# Patient Record
Sex: FEMALE | Race: WHITE | NOT HISPANIC OR LATINO | Employment: FULL TIME | URBAN - METROPOLITAN AREA
[De-identification: names, ages, dates, MRNs, and addresses within clinical notes are randomized per-mention and may not be internally consistent; named-entity substitution may affect disease eponyms.]

---

## 2017-01-30 ENCOUNTER — TELEPHONE (OUTPATIENT)
Dept: FAMILY MEDICINE CLINIC | Facility: CLINIC | Age: 42
End: 2017-01-30

## 2017-01-30 NOTE — TELEPHONE ENCOUNTER
Pt called stating she got discharged from psychiatrist due to missing too many appts. She wants to know if you will began prescribing for her. She is taking Ambien, Trazodone, Prozac, Wellbutrin.

## 2017-02-22 ENCOUNTER — OFFICE VISIT (OUTPATIENT)
Dept: FAMILY MEDICINE CLINIC | Facility: CLINIC | Age: 42
End: 2017-02-22

## 2017-02-22 VITALS
BODY MASS INDEX: 44.41 KG/M2 | DIASTOLIC BLOOD PRESSURE: 82 MMHG | HEIGHT: 68 IN | WEIGHT: 293 LBS | HEART RATE: 90 BPM | TEMPERATURE: 98.3 F | SYSTOLIC BLOOD PRESSURE: 124 MMHG | OXYGEN SATURATION: 98 %

## 2017-02-22 DIAGNOSIS — K21.00 ESOPHAGITIS, REFLUX: ICD-10-CM

## 2017-02-22 DIAGNOSIS — G47.00 INSOMNIA, UNSPECIFIED TYPE: ICD-10-CM

## 2017-02-22 DIAGNOSIS — IMO0001 BLUES: Primary | ICD-10-CM

## 2017-02-22 PROCEDURE — 99214 OFFICE O/P EST MOD 30 MIN: CPT | Performed by: NURSE PRACTITIONER

## 2017-02-22 RX ORDER — ZOLPIDEM TARTRATE 10 MG/1
10 TABLET ORAL NIGHTLY
Qty: 30 TABLET | Refills: 0 | Status: SHIPPED | OUTPATIENT
Start: 2017-02-22 | End: 2017-04-07 | Stop reason: SDUPTHER

## 2017-02-22 RX ORDER — FLUOXETINE HYDROCHLORIDE 20 MG/1
20 CAPSULE ORAL NIGHTLY
Qty: 90 CAPSULE | Refills: 3 | Status: SHIPPED | OUTPATIENT
Start: 2017-02-22

## 2017-02-22 NOTE — PROGRESS NOTES
"Subjective   Roxie Dickson is a 42 y.o. female.     History of Present Illness   Patient presenting to the office today with concerns over her acid reflux.  She consistently takes Prilosec 20 mg every single night but she's noticed that she's having to take more in the morning when she wakes up.  She is having heartburn throughout the day belching and burping and abdominal bloating she's been on Prilosec for a long time but for the past several weeks is not working as well as it used to.    Also patient was getting her Prozac in her Ambien from her psychiatrist.  She switching her psychiatrist but does not see them until next month and she is ran out of both of those medications she is requesting to have this refilled.  Patient is doing well with the Ambien using it for difficulty falling asleep and is working well and has resolved her issues without any side effects.  Also the Prozac is working well to help with her mood she's taking it as directed without side effects  The following portions of the patient's history were reviewed and updated as appropriate: allergies, current medications, past social history and problem list.    Review of Systems   All other systems reviewed and are negative.      Objective   Visit Vitals   • /82 (BP Location: Right arm, Patient Position: Sitting)   • Pulse 90   • Temp 98.3 °F (36.8 °C)   • Ht 68\" (172.7 cm)   • Wt (!) 310 lb 6.4 oz (141 kg)   • SpO2 98%   • BMI 47.2 kg/m2     Physical Exam   Constitutional: She is oriented to person, place, and time. Vital signs are normal. She appears well-developed and well-nourished. No distress.   HENT:   Head: Normocephalic.   Cardiovascular: Normal rate, regular rhythm and normal heart sounds.    Pulmonary/Chest: Effort normal and breath sounds normal.   Neurological: She is alert and oriented to person, place, and time. Gait normal.   Psychiatric: She has a normal mood and affect. Her behavior is normal. Judgment and thought " content normal.   Vitals reviewed.      Assessment/Plan   Problem List Items Addressed This Visit        Digestive    Esophagitis, reflux    Relevant Orders    Ambulatory Referral to Gastroenterology       Other    Blues - Primary    Relevant Medications    FLUoxetine (PROzac) 20 MG capsule    Cannot sleep    Relevant Medications    zolpidem (AMBIEN) 10 MG tablet           increase Prilosec to 40mg at night

## 2017-03-06 ENCOUNTER — OFFICE VISIT (OUTPATIENT)
Dept: GASTROENTEROLOGY | Facility: CLINIC | Age: 42
End: 2017-03-06

## 2017-03-06 VITALS
WEIGHT: 293 LBS | BODY MASS INDEX: 44.41 KG/M2 | SYSTOLIC BLOOD PRESSURE: 138 MMHG | DIASTOLIC BLOOD PRESSURE: 92 MMHG | HEIGHT: 68 IN

## 2017-03-06 DIAGNOSIS — E66.01 MORBID OBESITY DUE TO EXCESS CALORIES (HCC): ICD-10-CM

## 2017-03-06 DIAGNOSIS — K21.9 GASTROESOPHAGEAL REFLUX DISEASE, ESOPHAGITIS PRESENCE NOT SPECIFIED: Primary | ICD-10-CM

## 2017-03-06 DIAGNOSIS — R19.8 ALTERNATING CONSTIPATION AND DIARRHEA: ICD-10-CM

## 2017-03-06 PROCEDURE — 99204 OFFICE O/P NEW MOD 45 MIN: CPT | Performed by: INTERNAL MEDICINE

## 2017-03-06 RX ORDER — DICYCLOMINE HCL 20 MG
20 TABLET ORAL EVERY 6 HOURS PRN
Qty: 90 TABLET | Refills: 3 | Status: SHIPPED | OUTPATIENT
Start: 2017-03-06 | End: 2017-06-24 | Stop reason: SDUPTHER

## 2017-03-06 RX ORDER — PANTOPRAZOLE SODIUM 40 MG/1
40 TABLET, DELAYED RELEASE ORAL DAILY
Qty: 30 TABLET | Refills: 2 | Status: SHIPPED | OUTPATIENT
Start: 2017-03-06 | End: 2017-05-28 | Stop reason: SDUPTHER

## 2017-03-06 NOTE — PROGRESS NOTES
Chief Complaint   Patient presents with   • Heartburn       Subjective     HPI    Roxie Dickson is a 42 y.o. female with a past medical history noted below who presents for evaluation of heartburn symptoms.  Symptoms present at least 5 years.  However she reports worsening over the past year.  She describes burning esophageal pain, coming up into her throat.  Worse at night and in the morning.  Associated water brash.  Some nausea,no vomiting.  No dysphagia.  She does not associate with any particular foods.  She has been taking Prilosec for the symptoms however she takes it at night.  She finds this incompletely relieves her symptoms and occasionally will take a second dose.  Complicating the issue, she is morbidly obese and has had a weight over 300 pounds for at least the past year.  She additionally endorses alternating constipation and diarrhea.  She describes this as not having a bowel movement for 3 days.  This will be followed by profuse watery diarrhea during the daytime on the remaining days.  There are no associated nocturnal stools.  She has had the symptoms also for years.    No familly history of GI malignancy.  No smoking or ETOH.  Works at a desk job.      Past Medical History   Diagnosis Date   • Acid reflux disease    • Allergic    • Anxiety    • Depression    • Diarrhea    • GERD (gastroesophageal reflux disease)    • History of anemia    • History of bronchitis    • History of mononucleosis    • Insomnia    • Low back pain    • Migraines    • PONV (postoperative nausea and vomiting)      dtr & mom experience nausea/vomiting         Current Outpatient Prescriptions:   •  albuterol (PROVENTIL HFA;VENTOLIN HFA) 108 (90 BASE) MCG/ACT inhaler, Inhale 2 puffs Every 4 (Four) Hours As Needed for wheezing., Disp: , Rfl:   •  buPROPion XL (WELLBUTRIN XL) 300 MG 24 hr tablet, Take 300 mg by mouth Every Night., Disp: , Rfl: 1  •  dicyclomine (BENTYL) 20 MG tablet, Take 1 tablet by mouth Every 6 (Six)  Hours As Needed (cramping, diarrhea) for up to 30 days., Disp: 90 tablet, Rfl: 3  •  DiphenhydrAMINE HCl (BENADRYL ALLERGY PO), Take 75 mg by mouth At Night As Needed., Disp: , Rfl:   •  FLUoxetine (PROzac) 20 MG capsule, Take 1 capsule by mouth Every Night., Disp: 90 capsule, Rfl: 3  •  HYDROcodone-acetaminophen (NORCO) 7.5-325 MG per tablet, Take 1 tablet by mouth Every 6 (Six) Hours As Needed for moderate pain (4-6)., Disp: 20 tablet, Rfl: 0  •  ibuprofen (ADVIL) 200 MG tablet, Take 800 mg by mouth Every 6 (Six) Hours As Needed for mild pain (1-3)., Disp: , Rfl:   •  pantoprazole (PROTONIX) 40 MG EC tablet, Take 1 tablet by mouth Daily., Disp: 30 tablet, Rfl: 2  •  traZODone (DESYREL) 150 MG tablet, Take 150 mg by mouth Every Night., Disp: , Rfl: 1  •  zolpidem (AMBIEN) 10 MG tablet, Take 1 tablet by mouth Every Night., Disp: 30 tablet, Rfl: 0    No Known Allergies    Social History     Social History   • Marital status: Single     Spouse name: N/A   • Number of children: N/A   • Years of education: N/A     Occupational History   • Not on file.     Social History Main Topics   • Smoking status: Never Smoker   • Smokeless tobacco: Never Used   • Alcohol use No   • Drug use: Yes     Special: Hydrocodone      Comment: prescribed by MD   • Sexual activity: Defer     Other Topics Concern   • Not on file     Social History Narrative       Family History   Problem Relation Age of Onset   • Hypertension Mother    • Cancer Maternal Grandmother      ovarian cancer   • Cancer Brother      leukemia       Review of Systems   Constitutional: Negative for activity change, appetite change and fatigue.   HENT: Negative for congestion, sore throat and trouble swallowing.    Eyes: Negative.    Respiratory: Positive for cough.    Cardiovascular: Negative.    Gastrointestinal: Positive for constipation and diarrhea. Negative for abdominal distention, abdominal pain and blood in stool.        + heartburn, alternating constipation and  diarrhea   Endocrine: Negative for cold intolerance and heat intolerance.   Genitourinary: Negative for difficulty urinating, dysuria and frequency.   Musculoskeletal: Negative for arthralgias, back pain and myalgias.   Skin: Negative.    Hematological: Negative for adenopathy. Does not bruise/bleed easily.   All other systems reviewed and are negative.      Objective     Vitals:    03/06/17 0806   BP: 138/92     Last 2 weights    03/06/17  0806   Weight: (!) 309 lb 12.8 oz (141 kg)     Body mass index is 47.1 kg/(m^2).    Physical Exam   Constitutional: She is oriented to person, place, and time. She appears well-developed and well-nourished. No distress.   Morbidly obese   HENT:   Head: Normocephalic and atraumatic.   Right Ear: External ear normal.   Left Ear: External ear normal.   Nose: Nose normal.   Mouth/Throat: Oropharynx is clear and moist.   Eyes: Conjunctivae and EOM are normal. Right eye exhibits no discharge. Left eye exhibits no discharge. No scleral icterus.   Neck: Normal range of motion. Neck supple. No thyromegaly present.   No supraclavicular adenopathy   Cardiovascular: Normal rate, regular rhythm, normal heart sounds and intact distal pulses.  Exam reveals no gallop.    No murmur heard.  No lower extremity edema   Pulmonary/Chest: Effort normal and breath sounds normal. No respiratory distress. She has no wheezes.   Abdominal: Soft. Normal appearance and bowel sounds are normal. She exhibits no distension and no mass. There is no hepatosplenomegaly. There is no tenderness. There is no rigidity, no rebound and no guarding. No hernia.   Genitourinary:   Genitourinary Comments: Rectal exam deferred   Musculoskeletal: Normal range of motion. She exhibits no edema or tenderness.   No atrophy of upper or lower extremities.  Normal digits and nails of both hands.   Lymphadenopathy:     She has no cervical adenopathy.   Neurological: She is alert and oriented to person, place, and time. She displays no  atrophy. Coordination normal.   Skin: Skin is warm and dry. No rash noted. She is not diaphoretic. No erythema.   Psychiatric: She has a normal mood and affect. Her behavior is normal. Judgment and thought content normal.   Vitals reviewed.      WBC   Date Value Ref Range Status   12/14/2016 6.22 4.50 - 10.70 10*3/mm3 Final   12/06/2016 8.33 4.50 - 10.70 10*3/mm3 Final     RBC   Date Value Ref Range Status   12/14/2016 4.44 3.90 - 5.20 10*6/mm3 Final   12/06/2016 4.68 3.90 - 5.20 10*6/mm3 Final     HEMOGLOBIN   Date Value Ref Range Status   12/14/2016 12.6 11.9 - 15.5 g/dL Final   12/06/2016 13.3 11.9 - 15.5 g/dL Final     HEMATOCRIT   Date Value Ref Range Status   12/14/2016 40.2 35.6 - 45.5 % Final   12/06/2016 41.6 35.6 - 45.5 % Final     MCV   Date Value Ref Range Status   12/14/2016 90.5 80.5 - 98.2 fL Final   12/06/2016 88.9 80.5 - 98.2 fL Final     MCH   Date Value Ref Range Status   12/14/2016 28.4 26.9 - 32.0 pg Final   12/06/2016 28.4 26.9 - 32.0 pg Final     MCHC   Date Value Ref Range Status   12/14/2016 31.3 (L) 32.4 - 36.3 g/dL Final   12/06/2016 32.0 (L) 32.4 - 36.3 g/dL Final     RDW   Date Value Ref Range Status   12/14/2016 13.6 (H) 11.7 - 13.0 % Final   12/06/2016 14.0 (H) 11.7 - 13.0 % Final     RDW-SD   Date Value Ref Range Status   12/14/2016 44.9 37.0 - 54.0 fl Final     MPV   Date Value Ref Range Status   12/14/2016 10.8 6.0 - 12.0 fL Final     PLATELETS   Date Value Ref Range Status   12/14/2016 270 140 - 500 10*3/mm3 Final   12/06/2016 310 140 - 500 10*3/mm3 Final     NEUTROPHIL %   Date Value Ref Range Status   12/14/2016 57.6 42.7 - 76.0 % Final     NEUTROPHIL REL %   Date Value Ref Range Status   12/06/2016 67.2 42.7 - 76.0 % Final     LYMPHOCYTE %   Date Value Ref Range Status   12/14/2016 35.2 19.6 - 45.3 % Final     LYMPHOCYTE REL %   Date Value Ref Range Status   12/06/2016 24.7 19.6 - 45.3 % Final     MONOCYTE %   Date Value Ref Range Status   12/14/2016 5.3 5.0 - 12.0 % Final      MONOCYTE REL %   Date Value Ref Range Status   12/06/2016 6.4 5.0 - 12.0 % Final     EOSINOPHIL %   Date Value Ref Range Status   12/14/2016 1.6 0.3 - 6.2 % Final     EOSINOPHIL REL %   Date Value Ref Range Status   12/06/2016 1.1 0.3 - 6.2 % Final     BASOPHIL %   Date Value Ref Range Status   12/14/2016 0.3 0.0 - 1.5 % Final     BASOPHIL REL %   Date Value Ref Range Status   12/06/2016 0.2 0.0 - 1.5 % Final     IMMATURE GRANS %   Date Value Ref Range Status   12/14/2016 0.0 0.0 - 0.5 % Final     NEUTROPHILS, ABSOLUTE   Date Value Ref Range Status   12/14/2016 3.58 1.90 - 8.10 10*3/mm3 Final     NEUTROPHILS ABSOLUTE   Date Value Ref Range Status   12/06/2016 5.60 1.90 - 8.10 10*3/mm3 Final     LYMPHOCYTES, ABSOLUTE   Date Value Ref Range Status   12/14/2016 2.19 0.90 - 4.80 10*3/mm3 Final     LYMPHOCYTES ABSOLUTE   Date Value Ref Range Status   12/06/2016 2.06 0.90 - 4.80 10*3/mm3 Final     MONOCYTES, ABSOLUTE   Date Value Ref Range Status   12/14/2016 0.33 0.20 - 1.20 10*3/mm3 Final     MONOCYTES ABSOLUTE   Date Value Ref Range Status   12/06/2016 0.53 0.20 - 1.20 10*3/mm3 Final     EOSINOPHILS, ABSOLUTE   Date Value Ref Range Status   12/14/2016 0.10 0.00 - 0.70 10*3/mm3 Final     EOSINOPHILS ABSOLUTE   Date Value Ref Range Status   12/06/2016 0.09 0.00 - 0.70 10*3/mm3 Final     BASOPHILS, ABSOLUTE   Date Value Ref Range Status   12/14/2016 0.02 0.00 - 0.20 10*3/mm3 Final     BASOPHILS ABSOLUTE   Date Value Ref Range Status   12/06/2016 0.02 0.00 - 0.20 10*3/mm3 Final     IMMATURE GRANS, ABSOLUTE   Date Value Ref Range Status   12/14/2016 0.00 0.00 - 0.03 10*3/mm3 Final       GLUCOSE   Date Value Ref Range Status   12/14/2016 102 (H) 65 - 99 mg/dL Final     SODIUM   Date Value Ref Range Status   12/14/2016 136 136 - 145 mmol/L Final   12/06/2016 137 136 - 145 mmol/L Final     POTASSIUM   Date Value Ref Range Status   12/14/2016 3.7 3.5 - 5.2 mmol/L Final   12/06/2016 4.3 3.5 - 5.2 mmol/L Final     CO2   Date  Value Ref Range Status   12/14/2016 25.7 22.0 - 29.0 mmol/L Final     TOTAL CO2   Date Value Ref Range Status   12/06/2016 23.3 22.0 - 29.0 mmol/L Final     CHLORIDE   Date Value Ref Range Status   12/14/2016 99 98 - 107 mmol/L Final   12/06/2016 99 98 - 107 mmol/L Final     ANION GAP   Date Value Ref Range Status   12/14/2016 11.3 mmol/L Final     CREATININE   Date Value Ref Range Status   12/14/2016 0.93 0.57 - 1.00 mg/dL Final   12/06/2016 0.86 0.57 - 1.00 mg/dL Final     BUN   Date Value Ref Range Status   12/14/2016 10 6 - 20 mg/dL Final   12/06/2016 10 6 - 20 mg/dL Final     BUN/CREATININE RATIO   Date Value Ref Range Status   12/14/2016 10.8 7.0 - 25.0 Final   12/06/2016 11.6 7.0 - 25.0 Final     CALCIUM   Date Value Ref Range Status   12/14/2016 9.4 8.6 - 10.5 mg/dL Final   12/06/2016 10.0 8.6 - 10.5 mg/dL Final     EGFR NON  AMER   Date Value Ref Range Status   12/14/2016 66 >60 mL/min/1.73 Final     EGFR NON  AM   Date Value Ref Range Status   12/06/2016 72 >60 mL/min/1.73 Final     ALKALINE PHOSPHATASE   Date Value Ref Range Status   12/14/2016 56 39 - 117 U/L Final   12/06/2016 58 39 - 117 U/L Final     TOTAL PROTEIN   Date Value Ref Range Status   12/14/2016 7.1 6.0 - 8.5 g/dL Final     ALT (SGPT)   Date Value Ref Range Status   12/14/2016 17 1 - 33 U/L Final   12/06/2016 13 1 - 33 U/L Final     AST (SGOT)   Date Value Ref Range Status   12/14/2016 16 1 - 32 U/L Final   12/06/2016 12 1 - 32 U/L Final     TOTAL BILIRUBIN   Date Value Ref Range Status   12/14/2016 0.3 0.1 - 1.2 mg/dL Final   12/06/2016 0.3 0.1 - 1.2 mg/dL Final     ALBUMIN   Date Value Ref Range Status   12/14/2016 4.00 3.50 - 5.20 g/dL Final   12/06/2016 4.30 3.50 - 5.20 g/dL Final     GLOBULIN   Date Value Ref Range Status   12/14/2016 3.1 gm/dL Final     A/G RATIO   Date Value Ref Range Status   12/14/2016 1.3 g/dL Final   12/06/2016 1.5 g/dL Final         12/7/16 GB USN  FINDINGS: The small part of the pancreas which is  visualized appears  normal. The liver demonstrates some slight hyperechogenicity in  comparison to the echogenicity of the right kidney. This suggests  hepatic steatosis. No focal hepatic lesion is identified. The  gallbladder is in situ and contains numerous calculi. The largest  measures about 1 cm diameter. There is no gallbladder wall thickening  nor any haroon-cholecystic fluid. The common bile duct measures 5 mm.  There is no sonographic Webster's sign. The right kidney appears normal  and measures about 11.8 cm long.      IMPRESSION:  Cholelithiasis without evidence of cholecystitis or biliary  obstruction      No notes on file    Assessment/Plan    Gastroesophageal reflux disease, esophagitis presence not specified: Long-standing issue with progressive worsening.  Incompletely treated with omeprazole.  No alarm symptoms (dysphagia, weight loss)    Alternating constipation and diarrhea: Given duration and stability of symptoms, I am suspicious for irritable bowel syndrome    Morbid obesity due to excess calories: She reports being over 300 pounds for at least the past year.  Her BMI is 47    Plan:  -She is to stop the omeprazole.  She is to start pantoprazole.  I have advised her to take this medication every morning 30 minutes before breakfast.  -She is to start dicyclomine every 6 hours as needed for diarrhea.  -She is to use over-the-counter MiraLAX as needed on days that she has constipation  -We discussed that if she does not improve in terms of her GERD symptoms in the next few months, then EGD evaluation may be necessary  -Also advised a goal of getting her weight below 300 pounds over the next 4-5 months.  This will help not only her GERD symptoms but also her general health    Roxie was seen today for heartburn.    Diagnoses and all orders for this visit:    Gastroesophageal reflux disease, esophagitis presence not specified  -     pantoprazole (PROTONIX) 40 MG EC tablet; Take 1 tablet by mouth  Daily.    Alternating constipation and diarrhea  -     dicyclomine (BENTYL) 20 MG tablet; Take 1 tablet by mouth Every 6 (Six) Hours As Needed (cramping, diarrhea) for up to 30 days.    Morbid obesity due to excess calories        I have discussed the above plan with the patient.  They verbalize understanding and are in agreement with the plan.  They have been advised to contact the office for any questions, concerns, or changes related to their health.    EMR Dragon/Transcription disclaimer:       Much of this encounter note is an electronic transcription/translation of spoken language to printed text. The electronic translation of spoken language may permit erroneous, or at times, nonsensical words or phrases to be inadvertently transcribed; Although I have reviewed the note for such errors, some may still exist.

## 2017-03-06 NOTE — PATIENT INSTRUCTIONS
Start the pantoprazole, take every morning  30 mins before breakfast    Dicyclomine on days with diarrhea, miralax or milk of magnesia for constipation    Goal of 10# weight loss over the next 4-5 months    For any additional questions, concerns or changes to your condition after today's office visit please contact the office at [phone].

## 2017-03-08 ENCOUNTER — OFFICE VISIT (OUTPATIENT)
Dept: SURGERY | Facility: CLINIC | Age: 42
End: 2017-03-08

## 2017-03-08 VITALS — HEIGHT: 68 IN | HEART RATE: 118 BPM | BODY MASS INDEX: 44.41 KG/M2 | OXYGEN SATURATION: 98 % | WEIGHT: 293 LBS

## 2017-03-08 DIAGNOSIS — K90.9 DIARRHEA DUE TO MALABSORPTION: Primary | ICD-10-CM

## 2017-03-08 DIAGNOSIS — R19.7 DIARRHEA DUE TO MALABSORPTION: Primary | ICD-10-CM

## 2017-03-08 PROCEDURE — 99024 POSTOP FOLLOW-UP VISIT: CPT | Performed by: SURGERY

## 2017-03-08 RX ORDER — TRAZODONE HYDROCHLORIDE 100 MG/1
1 TABLET ORAL DAILY
COMMUNITY
Start: 2017-03-07 | End: 2018-04-05 | Stop reason: DRUGHIGH

## 2017-03-08 NOTE — PATIENT INSTRUCTIONS
PLEASE START THIS AFTER YOU HAVE YOUR COLONOSCOPY,  IF YOU HAVE ONE SCHEDULED    You may have an increase in bloating and gas for the first 2 weeks after you start this additional fiber, if you stick with this it should gradually go away.    Please pay attention to your water intake and stay well hydrated, very few people drink enough water.     Metamucil one Tablespoon in 8 oz of water then ileana with another 8 oz of water in the morning or Fibercon or Citracel   You want insoluable fiber  You may use Milk of Mag or Miralax for constipation  And these may be taken with supplemental fiber     High-Fiber Diet  Fiber, also called dietary fiber, is a type of carbohydrate found in fruits, vegetables, whole grains, and beans. A high-fiber diet can have many health benefits. Your health care provider may recommend a high-fiber diet to help:  · Prevent constipation. Fiber can make your bowel movements more regular.  · Lower your cholesterol.  · Relieve hemorrhoids, uncomplicated diverticulosis, or irritable bowel syndrome.  · Prevent overeating as part of a weight-loss plan.  · Prevent heart disease, type 2 diabetes, and certain cancers.  WHAT IS MY PLAN?  The recommended daily intake of fiber includes:  · 38 grams for men under age 50.  · 30 grams for men over age 50.  · 25 grams for women under age 50.  · 21 grams for women over age 50.  You can get the recommended daily intake of dietary fiber by eating a variety of fruits, vegetables, grains, and beans. Your health care provider may also recommend a fiber supplement if it is not possible to get enough fiber through your diet.  WHAT DO I NEED TO KNOW ABOUT A HIGH-FIBER DIET?  · Fiber supplements have not been widely studied for their effectiveness, so it is better to get fiber through food sources.  · Always check the fiber content on the nutrition facts label of any prepackaged food. Look for foods that contain at least 5 grams of fiber per serving.  · Ask your  dietitian if you have questions about specific foods that are related to your condition, especially if those foods are not listed in the following section.  · Increase your daily fiber consumption gradually. Increasing your intake of dietary fiber too quickly may cause bloating, cramping, or gas.  · Drink plenty of water. Water helps you to digest fiber.  WHAT FOODS CAN I EAT?  Grains  Whole-grain breads. Multigrain cereal. Oats and oatmeal. Brown rice. Barley. Bulgur wheat. Millet. Bran muffins. Popcorn. Rye wafer crackers.  Vegetables  Sweet potatoes. Spinach. Kale. Artichokes. Cabbage. Broccoli. Green peas. Carrots. Squash.  Fruits  Berries. Pears. Apples. Oranges. Avocados. Prunes and raisins. Dried figs.  Meats and Other Protein Sources  Navy, kidney, elias, and soy beans. Split peas. Lentils. Nuts and seeds.  Dairy  Fiber-fortified yogurt.  Beverages  Fiber-fortified soy milk. Fiber-fortified orange juice.  Other  Fiber bars.  The items listed above may not be a complete list of recommended foods or beverages. Contact your dietitian for more options.  WHAT FOODS ARE NOT RECOMMENDED?  Grains  White bread. Pasta made with refined flour. White rice.  Vegetables  Fried potatoes. Canned vegetables. Well-cooked vegetables.   Fruits  Fruit juice. Cooked, strained fruit.  Meats and Other Protein Sources  Fatty cuts of meat. Fried poultry or fried fish.  Dairy  Milk. Yogurt. Cream cheese. Sour cream.  Beverages  Soft drinks.  Other  Cakes and pastries. Butter and oils.  The items listed above may not be a complete list of foods and beverages to avoid. Contact your dietitian for more information.  WHAT ARE SOME TIPS FOR INCLUDING HIGH-FIBER FOODS IN MY DIET?  · Eat a wide variety of high-fiber foods.  · Make sure that half of all grains consumed each day are whole grains.  · Replace breads and cereals made from refined flour or white flour with whole-grain breads and cereals.  · Replace white rice with brown rice,  bulgur wheat, or millet.  · Start the day with a breakfast that is high in fiber, such as a cereal that contains at least 5 grams of fiber per serving.  · Use beans in place of meat in soups, salads, or pasta.  · Eat high-fiber snacks, such as berries, raw vegetables, nuts, or popcorn.      Make sure you discuss any questions you have with your health care provider.

## 2017-03-08 NOTE — PROGRESS NOTES
Follow-up laparoscopic cholecystectomy for symptomatically cholelithiasis    Subjective:  Patient return to the office today almost 3 months after her cholecystectomy with complaints of diarrhea.  She had recently seen gastroenterology for this and they feel that she has-year-old bowel disease.  I think that's very reasonable assessment and is likely the majority of the cause of her problem.  The patient does think that her diarrhea may be a little bit worse than it was before her surgery, but was certainly present at that time.  She says sometimes she'll go 2 or 3 days without having a bowel movement and then have a day or 2 of severe diarrhea going up to 6 times a day.  This is actually caused her to miss work 5-6 times over the last couple of months.  She was recently started on Protonix and Bentyl, but has actually not started taking them just yet.    Objective:  Gen.: No acute distress, alert and oriented  Abdomen: Soft and benign, incisions nicely healed, no hernia,and pain    Assessment and plan:  Status post cholecystectomy, recovering well  Diarrhea which may be related to irritable bowel disease and/or a postcholecystectomy type of diarrhea.  I'm going to allow her to try the Bentyl and see how she improves with that.  I also encouraged her to improve her fiber intake as she says that her diet essentially consists of Pop tarts, chicken and soda and popcorn.  As stated by Dr. Green, weight loss is critical for this patient and I stressed this with her as well.  If she does not respond to Bentyl, it may be worth considering adding Colestipol, as some post cholecystectomy diarrheal patients will respond.      Frederick Ortega MD  General and Endoscopic Surgery  [facility], [place]    [address], [address]  [place], [place], [address]  P: [phone]  F: [phone]

## 2017-04-03 ENCOUNTER — TELEPHONE (OUTPATIENT)
Dept: FAMILY MEDICINE CLINIC | Facility: CLINIC | Age: 42
End: 2017-04-03

## 2017-04-07 DIAGNOSIS — G47.00 INSOMNIA, UNSPECIFIED TYPE: ICD-10-CM

## 2017-04-07 RX ORDER — ZOLPIDEM TARTRATE 10 MG/1
TABLET ORAL
Qty: 30 TABLET | Refills: 0 | Status: CANCELLED | OUTPATIENT
Start: 2017-04-07

## 2017-04-07 RX ORDER — ZOLPIDEM TARTRATE 10 MG/1
10 TABLET ORAL NIGHTLY
Qty: 30 TABLET | Refills: 0 | Status: SHIPPED | OUTPATIENT
Start: 2017-04-07 | End: 2017-04-07 | Stop reason: SDUPTHER

## 2017-04-07 RX ORDER — ZOLPIDEM TARTRATE 10 MG/1
10 TABLET ORAL NIGHTLY
Qty: 30 TABLET | Refills: 0 | OUTPATIENT
Start: 2017-04-07 | End: 2017-05-15 | Stop reason: SDUPTHER

## 2017-05-11 DIAGNOSIS — G47.00 INSOMNIA, UNSPECIFIED TYPE: ICD-10-CM

## 2017-05-12 RX ORDER — ZOLPIDEM TARTRATE 10 MG/1
TABLET ORAL
Qty: 30 TABLET | Refills: 0 | OUTPATIENT
Start: 2017-05-12

## 2017-05-15 ENCOUNTER — OFFICE VISIT (OUTPATIENT)
Dept: FAMILY MEDICINE CLINIC | Facility: CLINIC | Age: 42
End: 2017-05-15

## 2017-05-15 VITALS
HEIGHT: 68 IN | DIASTOLIC BLOOD PRESSURE: 78 MMHG | WEIGHT: 293 LBS | OXYGEN SATURATION: 97 % | HEART RATE: 74 BPM | BODY MASS INDEX: 44.41 KG/M2 | TEMPERATURE: 97.9 F | SYSTOLIC BLOOD PRESSURE: 122 MMHG

## 2017-05-15 DIAGNOSIS — G47.00 INSOMNIA, UNSPECIFIED TYPE: ICD-10-CM

## 2017-05-15 DIAGNOSIS — Z00.00 ROUTINE GENERAL MEDICAL EXAMINATION AT A HEALTH CARE FACILITY: Primary | ICD-10-CM

## 2017-05-15 LAB
ALBUMIN SERPL-MCNC: 4 G/DL (ref 3.5–5.2)
ALBUMIN/GLOB SERPL: 1.7 G/DL
ALP SERPL-CCNC: 58 U/L (ref 39–117)
ALT SERPL-CCNC: 21 U/L (ref 1–33)
AST SERPL-CCNC: 16 U/L (ref 1–32)
BASOPHILS # BLD AUTO: 0.02 10*3/MM3 (ref 0–0.2)
BASOPHILS NFR BLD AUTO: 0.4 % (ref 0–1.5)
BILIRUB SERPL-MCNC: 0.2 MG/DL (ref 0.1–1.2)
BUN SERPL-MCNC: 8 MG/DL (ref 6–20)
BUN/CREAT SERPL: 10.4 (ref 7–25)
CALCIUM SERPL-MCNC: 9.3 MG/DL (ref 8.6–10.5)
CHLORIDE SERPL-SCNC: 99 MMOL/L (ref 98–107)
CHOLEST SERPL-MCNC: 249 MG/DL (ref 0–200)
CO2 SERPL-SCNC: 25.9 MMOL/L (ref 22–29)
CREAT SERPL-MCNC: 0.77 MG/DL (ref 0.57–1)
EOSINOPHIL # BLD AUTO: 0.13 10*3/MM3 (ref 0–0.7)
EOSINOPHIL NFR BLD AUTO: 2.5 % (ref 0.3–6.2)
ERYTHROCYTE [DISTWIDTH] IN BLOOD BY AUTOMATED COUNT: 13.9 % (ref 11.7–13)
GLOBULIN SER CALC-MCNC: 2.4 GM/DL
GLUCOSE SERPL-MCNC: 117 MG/DL (ref 65–99)
HCT VFR BLD AUTO: 40.2 % (ref 35.6–45.5)
HDLC SERPL-MCNC: 45 MG/DL (ref 40–60)
HGB BLD-MCNC: 13 G/DL (ref 11.9–15.5)
IMM GRANULOCYTES # BLD: 0.02 10*3/MM3 (ref 0–0.03)
IMM GRANULOCYTES NFR BLD: 0.4 % (ref 0–0.5)
LDLC SERPL CALC-MCNC: 175 MG/DL (ref 0–100)
LDLC/HDLC SERPL: 3.9 {RATIO}
LYMPHOCYTES # BLD AUTO: 1.76 10*3/MM3 (ref 0.9–4.8)
LYMPHOCYTES NFR BLD AUTO: 34.1 % (ref 19.6–45.3)
MCH RBC QN AUTO: 28.6 PG (ref 26.9–32)
MCHC RBC AUTO-ENTMCNC: 32.3 G/DL (ref 32.4–36.3)
MCV RBC AUTO: 88.4 FL (ref 80.5–98.2)
MONOCYTES # BLD AUTO: 0.32 10*3/MM3 (ref 0.2–1.2)
MONOCYTES NFR BLD AUTO: 6.2 % (ref 5–12)
NEUTROPHILS # BLD AUTO: 2.91 10*3/MM3 (ref 1.9–8.1)
NEUTROPHILS NFR BLD AUTO: 56.4 % (ref 42.7–76)
PLATELET # BLD AUTO: 288 10*3/MM3 (ref 140–500)
POTASSIUM SERPL-SCNC: 4.4 MMOL/L (ref 3.5–5.2)
PROT SERPL-MCNC: 6.4 G/DL (ref 6–8.5)
RBC # BLD AUTO: 4.55 10*6/MM3 (ref 3.9–5.2)
SODIUM SERPL-SCNC: 137 MMOL/L (ref 136–145)
TRIGL SERPL-MCNC: 143 MG/DL (ref 0–150)
VLDLC SERPL CALC-MCNC: 28.6 MG/DL (ref 5–40)
WBC # BLD AUTO: 5.16 10*3/MM3 (ref 4.5–10.7)

## 2017-05-15 PROCEDURE — 99213 OFFICE O/P EST LOW 20 MIN: CPT | Performed by: NURSE PRACTITIONER

## 2017-05-15 RX ORDER — ZOLPIDEM TARTRATE 10 MG/1
10 TABLET ORAL NIGHTLY
Qty: 30 TABLET | Refills: 0 | Status: SHIPPED | OUTPATIENT
Start: 2017-05-15 | End: 2017-06-15 | Stop reason: SDUPTHER

## 2017-05-19 RX ORDER — ROSUVASTATIN CALCIUM 20 MG/1
20 TABLET, COATED ORAL DAILY
Qty: 30 TABLET | Refills: 3 | Status: SHIPPED | OUTPATIENT
Start: 2017-05-19 | End: 2018-04-06

## 2017-05-28 DIAGNOSIS — K21.9 GASTROESOPHAGEAL REFLUX DISEASE, ESOPHAGITIS PRESENCE NOT SPECIFIED: ICD-10-CM

## 2017-05-30 RX ORDER — PANTOPRAZOLE SODIUM 40 MG/1
TABLET, DELAYED RELEASE ORAL
Qty: 30 TABLET | Refills: 2 | Status: SHIPPED | OUTPATIENT
Start: 2017-05-30 | End: 2018-03-06 | Stop reason: ALTCHOICE

## 2017-06-14 ENCOUNTER — TELEPHONE (OUTPATIENT)
Dept: GASTROENTEROLOGY | Facility: CLINIC | Age: 42
End: 2017-06-14

## 2017-06-14 RX ORDER — DICYCLOMINE HCL 20 MG
20 TABLET ORAL EVERY 6 HOURS
Qty: 90 TABLET | Refills: 3 | Status: SHIPPED | OUTPATIENT
Start: 2017-06-14 | End: 2017-06-26

## 2017-06-14 NOTE — TELEPHONE ENCOUNTER
Faxed request received from [facility] for Dicyclomine 20 mg - take 1 tab po every 6  Hrs as needed for cramping, diarrhea.  Per o/v note of 3/6/17 - was prescribed to use for up to 30 days.    Message to Dr Green to check if ok to refill.

## 2017-06-15 DIAGNOSIS — G47.00 INSOMNIA, UNSPECIFIED TYPE: ICD-10-CM

## 2017-06-15 RX ORDER — ZOLPIDEM TARTRATE 10 MG/1
TABLET ORAL
Qty: 30 TABLET | Refills: 0 | OUTPATIENT
Start: 2017-06-15 | End: 2017-07-14 | Stop reason: SDUPTHER

## 2017-06-19 ENCOUNTER — OFFICE VISIT (OUTPATIENT)
Dept: GASTROENTEROLOGY | Facility: CLINIC | Age: 42
End: 2017-06-19

## 2017-06-19 VITALS
HEIGHT: 68 IN | TEMPERATURE: 98 F | DIASTOLIC BLOOD PRESSURE: 82 MMHG | BODY MASS INDEX: 44.41 KG/M2 | SYSTOLIC BLOOD PRESSURE: 128 MMHG | WEIGHT: 293 LBS

## 2017-06-19 DIAGNOSIS — R11.2 NON-INTRACTABLE VOMITING WITH NAUSEA, UNSPECIFIED VOMITING TYPE: ICD-10-CM

## 2017-06-19 DIAGNOSIS — K21.9 GASTROESOPHAGEAL REFLUX DISEASE, ESOPHAGITIS PRESENCE NOT SPECIFIED: Primary | ICD-10-CM

## 2017-06-19 DIAGNOSIS — E66.01 MORBID OBESITY DUE TO EXCESS CALORIES (HCC): ICD-10-CM

## 2017-06-19 DIAGNOSIS — R19.8 ALTERNATING CONSTIPATION AND DIARRHEA: ICD-10-CM

## 2017-06-19 PROCEDURE — 99214 OFFICE O/P EST MOD 30 MIN: CPT | Performed by: INTERNAL MEDICINE

## 2017-06-19 RX ORDER — SODIUM CHLORIDE, SODIUM LACTATE, POTASSIUM CHLORIDE, CALCIUM CHLORIDE 600; 310; 30; 20 MG/100ML; MG/100ML; MG/100ML; MG/100ML
30 INJECTION, SOLUTION INTRAVENOUS CONTINUOUS
Status: CANCELLED | OUTPATIENT
Start: 2017-06-19

## 2017-06-19 RX ORDER — SODIUM CHLORIDE 0.9 % (FLUSH) 0.9 %
1-10 SYRINGE (ML) INJECTION AS NEEDED
Status: CANCELLED | OUTPATIENT
Start: 2017-06-19

## 2017-06-19 RX ORDER — ONDANSETRON 4 MG/1
4 TABLET, ORALLY DISINTEGRATING ORAL EVERY 8 HOURS PRN
Qty: 12 TABLET | Refills: 0 | Status: SHIPPED | OUTPATIENT
Start: 2017-06-19 | End: 2019-03-04 | Stop reason: HOSPADM

## 2017-06-19 NOTE — PROGRESS NOTES
"Chief Complaint   Patient presents with   • Heartburn     Subjective     HPI  Roxie Dickson is a 42 y.o. female who presents for follow up of GERD, alternating diarrhea and constipation, and morbid obesity.  Initially seen March 6th.  She had been taking omeprazole for her GERD with incomplete relief.  I started her on pantoprazole.  Trial of dicyclomine for diarrhea symptoms and goal of 15# weight loss over 3-4 months.    In terms of her GERD, she has been taking the pantoprazole every morning.  This was working but then she had some breakthrough symptoms and added in a nightime dose of omeprazole which has been helping.  She does take 800mg ibuprofen about 1x per week.  Usually for migraines.  No other NSAIDs.  Still with some breakthrough symptoms.  She has not had an EGD.    In terms of her alternating diarrhea and constipation, dicyclomine has helped.  She takes one in the morning and one in the pm.  She has not had too many issues with constipation.    In terms of her obesity, weight is unchanged.  She has not adjusted her eating habits or activity levels.  BMI remains 48.    New issue of \"violent illness\" every couple of months.  Always occurring in the morning.  She wakes up vomiting, followed by diarrhea.  Lasts an hour or two. She then gets the chills.  She lays down for a few hours and then feels better.  Has not identified any precipitating foods, meds, events.  Similar to food borne illness she had years ago.    Past Medical History:   Diagnosis Date   • Acid reflux disease    • Allergic    • Anxiety    • Depression    • Diarrhea    • GERD (gastroesophageal reflux disease)    • History of anemia    • History of bronchitis    • History of mononucleosis    • Insomnia    • Low back pain    • Migraines    • PONV (postoperative nausea and vomiting)     dtr & mom experience nausea/vomiting       Social History     Social History   • Marital status: Single     Spouse name: N/A   • Number of children: N/A "   • Years of education: N/A     Social History Main Topics   • Smoking status: Never Smoker   • Smokeless tobacco: Never Used   • Alcohol use No   • Drug use: Yes     Special: Hydrocodone      Comment: prescribed by MD   • Sexual activity: Defer     Other Topics Concern   • None     Social History Narrative         Current Outpatient Prescriptions:   •  albuterol (PROVENTIL HFA;VENTOLIN HFA) 108 (90 BASE) MCG/ACT inhaler, Inhale 2 puffs Every 4 (Four) Hours As Needed for wheezing., Disp: , Rfl:   •  buPROPion XL (WELLBUTRIN XL) 300 MG 24 hr tablet, Take 300 mg by mouth Every Night., Disp: , Rfl: 1  •  dicyclomine (BENTYL) 20 MG tablet, Take 1 tablet by mouth Every 6 (Six) Hours., Disp: 90 tablet, Rfl: 3  •  DiphenhydrAMINE HCl (BENADRYL ALLERGY PO), Take 75 mg by mouth At Night As Needed., Disp: , Rfl:   •  FLUoxetine (PROzac) 20 MG capsule, Take 1 capsule by mouth Every Night., Disp: 90 capsule, Rfl: 3  •  ibuprofen (ADVIL) 200 MG tablet, Take 800 mg by mouth Every 6 (Six) Hours As Needed for mild pain (1-3)., Disp: , Rfl:   •  Omeprazole Magnesium (PRILOSEC OTC PO), Take  by mouth., Disp: , Rfl:   •  pantoprazole (PROTONIX) 40 MG EC tablet, TAKE 1 TABLET BY MOUTH DAILY., Disp: 30 tablet, Rfl: 2  •  rosuvastatin (CRESTOR) 20 MG tablet, Take 1 tablet by mouth Daily., Disp: 30 tablet, Rfl: 3  •  traZODone (DESYREL) 100 MG tablet, Take 1 tablet by mouth Daily., Disp: , Rfl:   •  zolpidem (AMBIEN) 10 MG tablet, TAKE 1 TABLET BY MOUTH EVERY NIGHT, Disp: 30 tablet, Rfl: 0  •  ondansetron ODT (ZOFRAN ODT) 4 MG disintegrating tablet, Take 1 tablet by mouth Every 8 (Eight) Hours As Needed for Nausea or Vomiting., Disp: 12 tablet, Rfl: 0    Review of Systems   Constitutional: Negative for activity change, appetite change and fatigue.   HENT: Negative for congestion, sore throat and trouble swallowing.    Respiratory: Negative.    Cardiovascular: Negative.    Gastrointestinal: Positive for diarrhea, nausea and vomiting.  Negative for abdominal distention, abdominal pain and blood in stool.        +heartburn   Endocrine: Negative for cold intolerance and heat intolerance.   Genitourinary: Negative for difficulty urinating, dysuria and frequency.   Musculoskeletal: Negative for arthralgias, back pain and myalgias.   Skin: Negative.    Hematological: Negative for adenopathy. Does not bruise/bleed easily.   All other systems reviewed and are negative.      Objective   Vitals:    06/19/17 0822   BP: 128/82   Temp: 98 °F (36.7 °C)     Last Weight    06/19/17 0822   Weight: (!) 316 lb 6.4 oz (144 kg)     Body mass index is 48.11 kg/(m^2).      Physical Exam   Constitutional: She is oriented to person, place, and time. She appears well-developed and well-nourished. No distress.   obese   HENT:   Head: Normocephalic and atraumatic.   Right Ear: External ear normal.   Left Ear: External ear normal.   Nose: Nose normal.   Eyes: Conjunctivae and EOM are normal. No scleral icterus.   Cardiovascular: Normal rate, regular rhythm, normal heart sounds and intact distal pulses.  Exam reveals no gallop.    No murmur heard.  No lower extremity edema   Pulmonary/Chest: Effort normal and breath sounds normal. No respiratory distress. She has no wheezes.   Abdominal: Soft. Normal appearance and bowel sounds are normal. She exhibits no distension and no mass. There is no hepatosplenomegaly. There is no rigidity, no rebound and no guarding. No hernia.   Genitourinary:   Genitourinary Comments: Rectal exam deferred   Musculoskeletal: Normal range of motion. She exhibits no edema or tenderness.   Normal digits and nails of both hands.  No atrophy or wasting of upper or lower extremeties   Neurological: She is alert and oriented to person, place, and time. She displays no atrophy. Coordination normal.   Skin: Skin is warm and dry. No rash noted. She is not diaphoretic. No erythema.   Psychiatric: She has a normal mood and affect. Her behavior is normal.  Judgment and thought content normal.   Vitals reviewed.      WBC   Date Value Ref Range Status   05/15/2017 5.16 4.50 - 10.70 10*3/mm3 Final   12/14/2016 6.22 4.50 - 10.70 10*3/mm3 Final     RBC   Date Value Ref Range Status   05/15/2017 4.55 3.90 - 5.20 10*6/mm3 Final   12/14/2016 4.44 3.90 - 5.20 10*6/mm3 Final     Hemoglobin   Date Value Ref Range Status   05/15/2017 13.0 11.9 - 15.5 g/dL Final   12/14/2016 12.6 11.9 - 15.5 g/dL Final     Hematocrit   Date Value Ref Range Status   05/15/2017 40.2 35.6 - 45.5 % Final   12/14/2016 40.2 35.6 - 45.5 % Final     MCV   Date Value Ref Range Status   05/15/2017 88.4 80.5 - 98.2 fL Final   12/14/2016 90.5 80.5 - 98.2 fL Final     MCH   Date Value Ref Range Status   05/15/2017 28.6 26.9 - 32.0 pg Final   12/14/2016 28.4 26.9 - 32.0 pg Final     MCHC   Date Value Ref Range Status   05/15/2017 32.3 (L) 32.4 - 36.3 g/dL Final   12/14/2016 31.3 (L) 32.4 - 36.3 g/dL Final     RDW   Date Value Ref Range Status   05/15/2017 13.9 (H) 11.7 - 13.0 % Final   12/14/2016 13.6 (H) 11.7 - 13.0 % Final     RDW-SD   Date Value Ref Range Status   12/14/2016 44.9 37.0 - 54.0 fl Final     MPV   Date Value Ref Range Status   12/14/2016 10.8 6.0 - 12.0 fL Final     Platelets   Date Value Ref Range Status   05/15/2017 288 140 - 500 10*3/mm3 Final   12/14/2016 270 140 - 500 10*3/mm3 Final     Neutrophil %   Date Value Ref Range Status   12/14/2016 57.6 42.7 - 76.0 % Final     Neutrophil Rel %   Date Value Ref Range Status   05/15/2017 56.4 42.7 - 76.0 % Final     Lymphocyte %   Date Value Ref Range Status   12/14/2016 35.2 19.6 - 45.3 % Final     Lymphocyte Rel %   Date Value Ref Range Status   05/15/2017 34.1 19.6 - 45.3 % Final     Monocyte %   Date Value Ref Range Status   12/14/2016 5.3 5.0 - 12.0 % Final     Monocyte Rel %   Date Value Ref Range Status   05/15/2017 6.2 5.0 - 12.0 % Final     Eosinophil %   Date Value Ref Range Status   12/14/2016 1.6 0.3 - 6.2 % Final     Eosinophil Rel %    Date Value Ref Range Status   05/15/2017 2.5 0.3 - 6.2 % Final     Basophil %   Date Value Ref Range Status   12/14/2016 0.3 0.0 - 1.5 % Final     Basophil Rel %   Date Value Ref Range Status   05/15/2017 0.4 0.0 - 1.5 % Final     Immature Grans %   Date Value Ref Range Status   12/14/2016 0.0 0.0 - 0.5 % Final     Neutrophils, Absolute   Date Value Ref Range Status   12/14/2016 3.58 1.90 - 8.10 10*3/mm3 Final     Neutrophils Absolute   Date Value Ref Range Status   05/15/2017 2.91 1.90 - 8.10 10*3/mm3 Final     Lymphocytes, Absolute   Date Value Ref Range Status   12/14/2016 2.19 0.90 - 4.80 10*3/mm3 Final     Lymphocytes Absolute   Date Value Ref Range Status   05/15/2017 1.76 0.90 - 4.80 10*3/mm3 Final     Monocytes, Absolute   Date Value Ref Range Status   12/14/2016 0.33 0.20 - 1.20 10*3/mm3 Final     Monocytes Absolute   Date Value Ref Range Status   05/15/2017 0.32 0.20 - 1.20 10*3/mm3 Final     Eosinophils, Absolute   Date Value Ref Range Status   12/14/2016 0.10 0.00 - 0.70 10*3/mm3 Final     Eosinophils Absolute   Date Value Ref Range Status   05/15/2017 0.13 0.00 - 0.70 10*3/mm3 Final     Basophils, Absolute   Date Value Ref Range Status   12/14/2016 0.02 0.00 - 0.20 10*3/mm3 Final     Basophils Absolute   Date Value Ref Range Status   05/15/2017 0.02 0.00 - 0.20 10*3/mm3 Final     Immature Grans, Absolute   Date Value Ref Range Status   12/14/2016 0.00 0.00 - 0.03 10*3/mm3 Final       Lab Results   Component Value Date    GLUCOSE 102 (H) 12/14/2016    BUN 8 05/15/2017    CREATININE 0.77 05/15/2017    EGFRIFNONA 81 05/15/2017    EGFRIFAFRI 99 05/15/2017    BCR 10.4 05/15/2017    CO2 25.9 05/15/2017    CALCIUM 9.3 05/15/2017    PROTENTOTREF 6.4 05/15/2017    ALBUMIN 4.00 05/15/2017    LABIL2 1.7 05/15/2017    AST 16 05/15/2017    ALT 21 05/15/2017         Imaging Results (last 7 days)     ** No results found for the last 168 hours. **            Assessment/Plan    1. GERD: Persistent despite twice daily  PPI.    2.  Alternating constipation and diarrhea: symptoms well controlled with dicyclomine  3. Acute vomiting, diarrhea episodes: New complaint for her today.  These intermittent and brief episodes make me concerned that she is getting some chronic preformed toxin/food poisoning in her body leading to prompt expulsion.  4.  Morbid obesity: revisited her need to lose weight    Plan:  -EGD for further evaluation given refractory to BID ppi  -zofran odt for acute vomiting episodes  -continue dicyclomine  -goal to 15# weight loss-- to cut 200cal per day    Roxie was seen today for heartburn.    Diagnoses and all orders for this visit:    Gastroesophageal reflux disease, esophagitis presence not specified  -     Case Request; Standing  -     Implement Anesthesia Orders Day of Procedure; Standing  -     Obtain Informed Consent; Standing  -     Insert Peripheral IV; Standing  -     Saline Lock & Maintain IV Access; Standing  -     sodium chloride 0.9 % flush 1-10 mL; Infuse 1-10 mL into a venous catheter As Needed for Line Care.  -     lactated ringers infusion; Infuse 30 mL/hr into a venous catheter Continuous.  -     Case Request    Alternating constipation and diarrhea    Morbid obesity due to excess calories    Non-intractable vomiting with nausea, unspecified vomiting type  -     ondansetron ODT (ZOFRAN ODT) 4 MG disintegrating tablet; Take 1 tablet by mouth Every 8 (Eight) Hours As Needed for Nausea or Vomiting.      Dictated utilizing Dragon dictation

## 2017-06-19 NOTE — PATIENT INSTRUCTIONS
Schedule the EGD    Continue the dicyclomine    Zofran as needed for nausea    Goal of 15# weight loss over the next 3-4 months-- cut 200 calories out per day    For any additional questions, concerns or changes to your condition after today's office visit please contact the office at [phone].

## 2017-06-24 DIAGNOSIS — R19.8 ALTERNATING CONSTIPATION AND DIARRHEA: ICD-10-CM

## 2017-06-26 RX ORDER — DICYCLOMINE HCL 20 MG
TABLET ORAL
Qty: 90 TABLET | Refills: 3 | Status: SHIPPED | OUTPATIENT
Start: 2017-06-26 | End: 2019-03-04 | Stop reason: HOSPADM

## 2017-06-28 ENCOUNTER — ANESTHESIA (OUTPATIENT)
Dept: GASTROENTEROLOGY | Facility: HOSPITAL | Age: 42
End: 2017-06-28

## 2017-06-28 ENCOUNTER — HOSPITAL ENCOUNTER (OUTPATIENT)
Facility: HOSPITAL | Age: 42
Setting detail: HOSPITAL OUTPATIENT SURGERY
Discharge: HOME | End: 2017-06-28
Attending: INTERNAL MEDICINE | Admitting: INTERNAL MEDICINE

## 2017-06-28 ENCOUNTER — ANESTHESIA EVENT (OUTPATIENT)
Dept: GASTROENTEROLOGY | Facility: HOSPITAL | Age: 42
End: 2017-06-28

## 2017-06-28 VITALS
HEART RATE: 80 BPM | RESPIRATION RATE: 16 BRPM | SYSTOLIC BLOOD PRESSURE: 125 MMHG | TEMPERATURE: 98.5 F | DIASTOLIC BLOOD PRESSURE: 76 MMHG | WEIGHT: 293 LBS | BODY MASS INDEX: 47.83 KG/M2 | OXYGEN SATURATION: 99 %

## 2017-06-28 DIAGNOSIS — K21.9 GASTROESOPHAGEAL REFLUX DISEASE, ESOPHAGITIS PRESENCE NOT SPECIFIED: ICD-10-CM

## 2017-06-28 PROCEDURE — 43239 EGD BIOPSY SINGLE/MULTIPLE: CPT | Performed by: INTERNAL MEDICINE

## 2017-06-28 PROCEDURE — 25010000002 PROPOFOL 10 MG/ML EMULSION: Performed by: ANESTHESIOLOGY

## 2017-06-28 PROCEDURE — 88305 TISSUE EXAM BY PATHOLOGIST: CPT | Performed by: INTERNAL MEDICINE

## 2017-06-28 PROCEDURE — 88312 SPECIAL STAINS GROUP 1: CPT | Performed by: INTERNAL MEDICINE

## 2017-06-28 RX ORDER — LIDOCAINE HYDROCHLORIDE 20 MG/ML
INJECTION, SOLUTION INFILTRATION; PERINEURAL AS NEEDED
Status: DISCONTINUED | OUTPATIENT
Start: 2017-06-28 | End: 2017-06-28 | Stop reason: SURG

## 2017-06-28 RX ORDER — PROPOFOL 10 MG/ML
VIAL (ML) INTRAVENOUS CONTINUOUS PRN
Status: DISCONTINUED | OUTPATIENT
Start: 2017-06-28 | End: 2017-06-28 | Stop reason: SURG

## 2017-06-28 RX ORDER — SODIUM CHLORIDE, SODIUM LACTATE, POTASSIUM CHLORIDE, CALCIUM CHLORIDE 600; 310; 30; 20 MG/100ML; MG/100ML; MG/100ML; MG/100ML
30 INJECTION, SOLUTION INTRAVENOUS CONTINUOUS
Status: DISCONTINUED | OUTPATIENT
Start: 2017-06-28 | End: 2017-06-28 | Stop reason: HOSPADM

## 2017-06-28 RX ORDER — PROPOFOL 10 MG/ML
VIAL (ML) INTRAVENOUS AS NEEDED
Status: DISCONTINUED | OUTPATIENT
Start: 2017-06-28 | End: 2017-06-28 | Stop reason: SURG

## 2017-06-28 RX ADMIN — SODIUM CHLORIDE, POTASSIUM CHLORIDE, SODIUM LACTATE AND CALCIUM CHLORIDE 30 ML/HR: 600; 310; 30; 20 INJECTION, SOLUTION INTRAVENOUS at 10:51

## 2017-06-28 RX ADMIN — LIDOCAINE HYDROCHLORIDE 50 MG: 20 INJECTION, SOLUTION INFILTRATION; PERINEURAL at 11:43

## 2017-06-28 RX ADMIN — PROPOFOL 140 MCG/KG/MIN: 10 INJECTION, EMULSION INTRAVENOUS at 11:43

## 2017-06-28 RX ADMIN — PROPOFOL 150 MG: 10 INJECTION, EMULSION INTRAVENOUS at 11:43

## 2017-06-28 NOTE — ANESTHESIA POSTPROCEDURE EVALUATION
Patient: Roxie Dickson    Procedure Summary     Date Anesthesia Start Anesthesia Stop Room / Location    06/28/17 1140 1200  [facility]       Procedure Diagnosis Surgeon Provider    ESOPHAGOGASTRODUODENOSCOPY WITH COLD BIOPSIES (N/A Esophagus) Gastroesophageal reflux disease, esophagitis presence not specified  (Gastroesophageal reflux disease, esophagitis presence not specified [K21.9]) MD Hitesh Martinez MD          Anesthesia Type: MAC  Last vitals  BP      Temp      Pulse     Resp      SpO2        Post Anesthesia Care and Evaluation    Patient location during evaluation: PACU  Patient participation: complete - patient participated  Level of consciousness: awake and alert  Pain score: 0  Pain management: adequate  Airway patency: patent  Anesthetic complications: No anesthetic complications    Cardiovascular status: acceptable  Respiratory status: acceptable  Hydration status: acceptable

## 2017-06-28 NOTE — ANESTHESIA PREPROCEDURE EVALUATION
Anesthesia Evaluation     Patient summary reviewed          Airway   Mallampati: IV  TM distance: <3 FB  Neck ROM: limited  possible difficult intubation  Dental - normal exam     Pulmonary     breath sounds clear to auscultation  Cardiovascular   Exercise tolerance: good (4-7 METS)    Rhythm: regular  Rate: normal        Neuro/Psych  GI/Hepatic/Renal/Endo      Musculoskeletal     Abdominal    Substance History      OB/GYN          Other                                        Anesthesia Plan    ASA 2     MAC     intravenous induction   Anesthetic plan and risks discussed with patient.

## 2017-06-29 LAB
CYTO UR: NORMAL
LAB AP CASE REPORT: NORMAL
Lab: NORMAL
PATH REPORT.FINAL DX SPEC: NORMAL
PATH REPORT.GROSS SPEC: NORMAL

## 2017-07-10 ENCOUNTER — TELEPHONE (OUTPATIENT)
Dept: GASTROENTEROLOGY | Facility: CLINIC | Age: 42
End: 2017-07-10

## 2017-07-10 NOTE — TELEPHONE ENCOUNTER
----- Message from Melisa Green MD sent at 6/29/2017  4:30 PM EDT -----  EGD biopsies: normal duodenum, mild gastritis of the stomach, esophageal inflammation with evidence of García's esophagus.    Continue daily PPI, repeat EGD in 3 years -- pls place in recall

## 2017-07-10 NOTE — TELEPHONE ENCOUNTER
Call to pt.  Advise per Dr Green that EGD bx show normal duodenum, mild gastritis of the stomach, esophageal inflammation with evidence of García's esophagus.    Continue daily PPI, repeat EGD in 3 yrs.  Pt verb understanding.    EGD for 6/28/20 placed in recall.

## 2017-07-14 DIAGNOSIS — G47.00 INSOMNIA, UNSPECIFIED TYPE: ICD-10-CM

## 2017-07-17 ENCOUNTER — TELEPHONE (OUTPATIENT)
Dept: GASTROENTEROLOGY | Facility: CLINIC | Age: 42
End: 2017-07-17

## 2017-07-17 RX ORDER — ZOLPIDEM TARTRATE 10 MG/1
TABLET ORAL
Qty: 30 TABLET | Refills: 0 | OUTPATIENT
Start: 2017-07-17

## 2017-07-17 NOTE — TELEPHONE ENCOUNTER
Called pt and advised per egd bx , normal duodenum , mild gastritis of the stoamch, esophageal inflammation with evidence of  García's esophagus.      She recommends to continue daily ppi, repeat egd in 3 yrs .  Pt verb understanding.     Egd placed in recall for 06/28/2020.

## 2017-07-24 PROBLEM — E78.5 HYPERLIPEMIA: Status: ACTIVE | Noted: 2017-07-24

## 2017-08-17 RX ORDER — SIMVASTATIN 20 MG
20 TABLET ORAL NIGHTLY
Qty: 30 TABLET | Refills: 6 | Status: SHIPPED | OUTPATIENT
Start: 2017-08-17 | End: 2018-02-21 | Stop reason: SDUPTHER

## 2017-08-27 DIAGNOSIS — G47.00 INSOMNIA, UNSPECIFIED TYPE: ICD-10-CM

## 2017-08-28 RX ORDER — ZOLPIDEM TARTRATE 10 MG/1
TABLET ORAL
Qty: 30 TABLET | Refills: 0 | OUTPATIENT
Start: 2017-08-28

## 2017-09-01 ENCOUNTER — DOCUMENTATION (OUTPATIENT)
Dept: GASTROENTEROLOGY | Facility: CLINIC | Age: 42
End: 2017-09-01

## 2017-09-08 ENCOUNTER — DOCUMENTATION (OUTPATIENT)
Dept: GASTROENTEROLOGY | Facility: CLINIC | Age: 42
End: 2017-09-08

## 2018-01-04 RX ORDER — DICYCLOMINE HCL 20 MG
TABLET ORAL
Qty: 90 TABLET | Refills: 1 | Status: SHIPPED | OUTPATIENT
Start: 2018-01-04 | End: 2018-02-24 | Stop reason: SDUPTHER

## 2018-02-21 RX ORDER — SIMVASTATIN 20 MG
TABLET ORAL
Qty: 30 TABLET | Refills: 5 | Status: SHIPPED | OUTPATIENT
Start: 2018-02-21 | End: 2018-03-06 | Stop reason: ALTCHOICE

## 2018-02-27 RX ORDER — DICYCLOMINE HCL 20 MG
TABLET ORAL
Qty: 90 TABLET | Refills: 1 | Status: SHIPPED | OUTPATIENT
Start: 2018-02-27 | End: 2018-03-06 | Stop reason: ALTCHOICE

## 2018-03-06 ENCOUNTER — OFFICE VISIT (OUTPATIENT)
Dept: CARDIOLOGY | Facility: CLINIC | Age: 43
End: 2018-03-06

## 2018-03-06 VITALS
DIASTOLIC BLOOD PRESSURE: 95 MMHG | BODY MASS INDEX: 45.99 KG/M2 | SYSTOLIC BLOOD PRESSURE: 141 MMHG | HEIGHT: 67 IN | WEIGHT: 293 LBS | HEART RATE: 87 BPM

## 2018-03-06 DIAGNOSIS — R00.2 PALPITATION: ICD-10-CM

## 2018-03-06 DIAGNOSIS — R07.9 CHEST PAIN, UNSPECIFIED TYPE: ICD-10-CM

## 2018-03-06 DIAGNOSIS — E66.09 CLASS 1 OBESITY DUE TO EXCESS CALORIES WITHOUT SERIOUS COMORBIDITY WITH BODY MASS INDEX (BMI) OF 30.0 TO 30.9 IN ADULT: Primary | ICD-10-CM

## 2018-03-06 DIAGNOSIS — E78.5 HYPERLIPIDEMIA, UNSPECIFIED HYPERLIPIDEMIA TYPE: ICD-10-CM

## 2018-03-06 PROCEDURE — 99213 OFFICE O/P EST LOW 20 MIN: CPT | Performed by: INTERNAL MEDICINE

## 2018-03-06 RX ORDER — BISOPROLOL FUMARATE AND HYDROCHLOROTHIAZIDE 5; 6.25 MG/1; MG/1
1 TABLET ORAL DAILY
Qty: 30 TABLET | Refills: 6 | Status: SHIPPED | OUTPATIENT
Start: 2018-03-06 | End: 2018-10-03 | Stop reason: SDUPTHER

## 2018-03-06 NOTE — PROGRESS NOTES
" Subjective:        CC  SOB    PALPITATIONS    CP, WITH PALPITATION, PRESSURE LASTING 1 HR, FOR LONG TIME     Roxie Dickson is a 43 y.o. female who Is here for the cardiac evaluation as the patient has been complaining of the chest pains tightness retrosternal lasting for approximately one hour long time more than several years, usually associated with the shortness of breath and anxiety mostly occurs at rest lasted several minutes. Cardiac risk factors: dyslipidemia and obesity (BMI >= 30 kg/m2).    Past Medical History:   Diagnosis Date   • Acid reflux disease    • Allergic    • Anxiety    • Depression    • Diarrhea    • GERD (gastroesophageal reflux disease)    • History of anemia    • History of bronchitis    • History of mononucleosis    • Insomnia    • Low back pain    • Migraines    • PONV (postoperative nausea and vomiting)     dtr & mom experience nausea/vomiting    reports that she has never smoked. She has never used smokeless tobacco. She reports that she uses illicit drugs, including Hydrocodone. She reports that she does not drink alcohol.  Family History   Problem Relation Age of Onset   • Hypertension Mother    • Cancer Maternal Grandmother      ovarian cancer   • Diverticulitis Maternal Grandmother    • Cancer Brother      leukemia        Review of Systems  Constitutional: No wt loss, fever   Gastrointestinal: No nausea , abdominal pain  Behavioral/Psych: No insomnia or anxiety  Cardiovascular ----positive for Palpitation. All other systems reviewed and are negative    Objective:                 Physical Exam  /95  Pulse 87  Ht 170.2 cm (67\")  Wt (!) 146 kg (322 lb)  BMI 50.43 kg/m2    General appearance: NAD, conversant   Eyes: anicteric sclerae, moist conjunctivae; no lid-lag; PERRLA   HENT: Atraumatic; oropharynx clear with moist mucous membranes and no mucosal ulcerations;  normal hard and soft palate   Neck: Trachea midline; FROM, supple, no thyromegaly or lymphadenopathy   Lungs: " CTA, with normal respiratory effort and no intercostal retractions   CV: S1-S2 regular, no murmurs, no rub, no gallop   Abdomen: Soft, non-tender; no masses or HSM   Extremities: No peripheral edema or extremity lymphadenopathy  Skin: Normal temperature, turgor and texture; no rash, ulcers or subcutaneous nodules   Psych: Appropriate affect, alert and oriented to person, place and time           Cardiographics  @Procedures    Echocardiogram:     Imaging  Chest x-ray: not done     Lab Review   not applicable       Current Outpatient Prescriptions:   •  albuterol (PROVENTIL HFA;VENTOLIN HFA) 108 (90 BASE) MCG/ACT inhaler, Inhale 2 puffs Every 4 (Four) Hours As Needed for wheezing., Disp: , Rfl:   •  buPROPion XL (WELLBUTRIN XL) 300 MG 24 hr tablet, Take 300 mg by mouth Every Night., Disp: , Rfl: 1  •  dicyclomine (BENTYL) 20 MG tablet, TAKE 1 TABLET BY MOUTH EVERY 6 (SIX) HOURS AS NEEDED (CRAMPING, DIARRHEA) FOR UP TO 30 DAYS., Disp: 90 tablet, Rfl: 3  •  DiphenhydrAMINE HCl (BENADRYL ALLERGY PO), Take 75 mg by mouth At Night As Needed., Disp: , Rfl:   •  FLUoxetine (PROzac) 20 MG capsule, Take 1 capsule by mouth Every Night., Disp: 90 capsule, Rfl: 3  •  ibuprofen (ADVIL) 200 MG tablet, Take 800 mg by mouth Every 6 (Six) Hours As Needed for mild pain (1-3)., Disp: , Rfl:   •  Omeprazole Magnesium (PRILOSEC OTC PO), Take  by mouth., Disp: , Rfl:   •  ondansetron ODT (ZOFRAN ODT) 4 MG disintegrating tablet, Take 1 tablet by mouth Every 8 (Eight) Hours As Needed for Nausea or Vomiting., Disp: 12 tablet, Rfl: 0  •  rosuvastatin (CRESTOR) 20 MG tablet, Take 1 tablet by mouth Daily., Disp: 30 tablet, Rfl: 3  •  traZODone (DESYREL) 100 MG tablet, Take 1 tablet by mouth Daily., Disp: , Rfl:   •  zolpidem (AMBIEN) 10 MG tablet, TAKE ONE TAB BY MOUTH AT BEDTIME, Disp: 30 tablet, Rfl: 0        Assessment:      No diagnosis found.    Patient Active Problem List   Diagnosis   • Blues   • Can't get food down   • Esophagitis,  reflux   • Cannot sleep   • LBP (low back pain)   • Breast lump   • Headache, migraine   • Avitaminosis D   • Otitis media follow-up, not resolved   • Diarrhea   • Eczema   • Routine general medical examination at a health care facility   • Hyperlipemia         Plan:          1. Class 1 obesity due to excess calories without serious comorbidity with body mass index (BMI) of 30.0 to 30.9 in adult  Significant risk of obesity to CAD, HTN has been explained  Advantages of wt reduction has been explained    2. Hyperlipidemia, unspecified hyperlipidemia type  Risk of the hyperlipidemia, importance of the treatment has been explained    Pros and cons of the statins has been explained    Regular blood workup as well as side effects including the liver failure, myelopathy death has been explained          3. Palpitation  Will admit the beta blockers    4. Chest pain, unspecified type  Considering the patient's symptoms as well as clinical situation and  EKG findings, along with cardiac risk factors, ischemic workup is necessary to rule out ischemic cardiomyopathy, stress induced arrhythmias, and functional capacity for diagnosis as well as prognostic consideration    ZIAC 5/6.25 MG PO DAILY    STRESS CARDIOLYTE    SEE IN 1 MONTH          Counseling was given to Roxie Dickson for the following topics:  risk factor reductions, prognosis and risks and benefits of treatment options .       SMOKING COUNSELING:    Counseling given: Not Answered      .  EMR Dragon/Transcription disclaimer:   Much of this encounter note is an electronic transcription/translation of spoken language to printed text. The electronic translation of spoken language may permit erroneous, or at times, nonsensical words or phrases to be inadvertently transcribed; Although I have reviewed the note for such errors, some may still exist.

## 2018-03-29 ENCOUNTER — HOSPITAL ENCOUNTER (OUTPATIENT)
Dept: CARDIOLOGY | Facility: HOSPITAL | Age: 43
Discharge: HOME | End: 2018-03-29
Attending: INTERNAL MEDICINE

## 2018-03-29 VITALS
DIASTOLIC BLOOD PRESSURE: 78 MMHG | HEIGHT: 67 IN | HEART RATE: 80 BPM | SYSTOLIC BLOOD PRESSURE: 127 MMHG | BODY MASS INDEX: 45.99 KG/M2 | WEIGHT: 293 LBS | OXYGEN SATURATION: 98 % | RESPIRATION RATE: 20 BRPM

## 2018-03-29 PROCEDURE — 93018 CV STRESS TEST I&R ONLY: CPT | Performed by: INTERNAL MEDICINE

## 2018-03-29 PROCEDURE — 93016 CV STRESS TEST SUPVJ ONLY: CPT | Performed by: INTERNAL MEDICINE

## 2018-03-29 PROCEDURE — 25010000002 REGADENOSON 0.4 MG/5ML SOLUTION: Performed by: INTERNAL MEDICINE

## 2018-03-29 PROCEDURE — 78452 HT MUSCLE IMAGE SPECT MULT: CPT

## 2018-03-29 PROCEDURE — 78452 HT MUSCLE IMAGE SPECT MULT: CPT | Performed by: INTERNAL MEDICINE

## 2018-03-29 PROCEDURE — 93017 CV STRESS TEST TRACING ONLY: CPT

## 2018-03-29 PROCEDURE — 0 TECHNETIUM SESTAMIBI: Performed by: INTERNAL MEDICINE

## 2018-03-29 PROCEDURE — A9500 TC99M SESTAMIBI: HCPCS | Performed by: INTERNAL MEDICINE

## 2018-03-29 RX ADMIN — TECHNETIUM TC 99M SESTAMIBI 1 DOSE: 1 INJECTION INTRAVENOUS at 07:39

## 2018-03-29 RX ADMIN — TECHNETIUM TC 99M SESTAMIBI 1 DOSE: 1 INJECTION INTRAVENOUS at 10:12

## 2018-03-29 RX ADMIN — REGADENOSON 0.4 MG: 0.08 INJECTION, SOLUTION INTRAVENOUS at 10:13

## 2018-04-02 LAB
BH CV STRESS BP STAGE 1: NORMAL
BH CV STRESS DURATION MIN STAGE 1: 2
BH CV STRESS DURATION SEC STAGE 1: 50
BH CV STRESS GRADE STAGE 1: 10
BH CV STRESS HR STAGE 1: 109
BH CV STRESS METS STAGE 1: 4.6
BH CV STRESS PROTOCOL 1: NORMAL
BH CV STRESS PROTOCOL 2 BP STAGE 1: NORMAL
BH CV STRESS PROTOCOL 2 COMMENTS STAGE 1: NORMAL
BH CV STRESS PROTOCOL 2 DOSE REGADENOSON STAGE 1: 0.4
BH CV STRESS PROTOCOL 2 DURATION MIN STAGE 1: 1
BH CV STRESS PROTOCOL 2 DURATION SEC STAGE 1: 15
BH CV STRESS PROTOCOL 2 HR STAGE 1: 118
BH CV STRESS PROTOCOL 2 O2 STAGE 1: 98
BH CV STRESS PROTOCOL 2 STAGE 1: 1
BH CV STRESS PROTOCOL 2: NORMAL
BH CV STRESS RECOVERY BP: NORMAL MMHG
BH CV STRESS RECOVERY HR: 91 BPM
BH CV STRESS RECOVERY O2: 98 %
BH CV STRESS SPEED STAGE 1: 1.7
BH CV STRESS STAGE 1: 1
LV EF NUC BP: 60 %
MAXIMAL PREDICTED HEART RATE: 175 BPM
PERCENT MAX PREDICTED HR: 67.43 %
STRESS BASELINE BP: NORMAL MMHG
STRESS BASELINE HR: 81 BPM
STRESS O2 SAT REST: 98 %
STRESS PERCENT HR: 79 %
STRESS POST ESTIMATED WORKLOAD: 4.6 METS
STRESS POST EXERCISE DUR MIN: 4 MIN
STRESS POST EXERCISE DUR SEC: 4 SEC
STRESS POST O2 SAT PEAK: 98 %
STRESS POST PEAK BP: NORMAL MMHG
STRESS POST PEAK HR: 118 BPM
STRESS TARGET HR: 149 BPM

## 2018-04-05 ENCOUNTER — TELEPHONE (OUTPATIENT)
Dept: FAMILY MEDICINE CLINIC | Facility: CLINIC | Age: 43
End: 2018-04-05

## 2018-04-05 ENCOUNTER — OFFICE VISIT (OUTPATIENT)
Dept: CARDIOLOGY | Facility: CLINIC | Age: 43
End: 2018-04-05

## 2018-04-05 VITALS
DIASTOLIC BLOOD PRESSURE: 78 MMHG | WEIGHT: 293 LBS | HEART RATE: 74 BPM | HEIGHT: 67 IN | BODY MASS INDEX: 45.99 KG/M2 | SYSTOLIC BLOOD PRESSURE: 120 MMHG

## 2018-04-05 DIAGNOSIS — R00.2 PALPITATION: ICD-10-CM

## 2018-04-05 DIAGNOSIS — E78.5 HYPERLIPIDEMIA, UNSPECIFIED HYPERLIPIDEMIA TYPE: Primary | ICD-10-CM

## 2018-04-05 PROCEDURE — 99212 OFFICE O/P EST SF 10 MIN: CPT | Performed by: INTERNAL MEDICINE

## 2018-04-05 RX ORDER — TRAZODONE HYDROCHLORIDE 150 MG/1
150 TABLET ORAL NIGHTLY
Refills: 3 | COMMUNITY
Start: 2018-03-06

## 2018-04-05 NOTE — TELEPHONE ENCOUNTER
[facility] pharmacy called needing PA info for Crestor. Does pt need an appt before med refill?

## 2018-04-05 NOTE — PROGRESS NOTES
" Subjective:       Roxie Dickson is a 43 y.o. female who here for follow up    CC  Follow-up for the hyperlipidemia and palpitation  HPI  42-year-old female with a history of the hyperlipidemia and palpitation here for the follow-up after the recent stress test     Problem List Items Addressed This Visit        Cardiovascular and Mediastinum    Hyperlipemia - Primary    Palpitation      Other Visit Diagnoses    None.       .    The following portions of the patient's history were reviewed and updated as appropriate: allergies, current medications, past family history, past medical history, past social history, past surgical history and problem list.    Past Medical History:   Diagnosis Date   • Acid reflux disease    • Allergic    • Anxiety    • Depression    • Diarrhea    • GERD (gastroesophageal reflux disease)    • History of anemia    • History of bronchitis    • History of mononucleosis    • Hypertension    • Insomnia    • Irritable bowel syndrome (IBS)    • Low back pain    • Migraines    • PONV (postoperative nausea and vomiting)     dtr & mom experience nausea/vomiting    reports that she has never smoked. She has never used smokeless tobacco. She reports that she uses drugs, including Hydrocodone. She reports that she does not drink alcohol.  Family History   Problem Relation Age of Onset   • Hypertension Mother    • Cancer Maternal Grandmother      ovarian cancer   • Diverticulitis Maternal Grandmother    • Cancer Brother      leukemia   • Alcohol abuse Father    • COPD Father        Review of Systems  Constitutional: No wt loss, fever, fatigue  Gastrointestinal: No nausea, abdominal pain  Behavioral/Psych: No insomnia or anxiety   Cardiovascular No chest pains or tightness in chest  Objective:       Physical Exam           Physical Exam  /78   Pulse 74   Ht 170.2 cm (67\")   Wt (!) 146 kg (322 lb)   BMI 50.43 kg/m²     General appearance: NAD, conversant   Eyes: anicteric sclerae, moist " conjunctivae; no lid-lag; PERRLA   HENT: Atraumatic; oropharynx clear with moist mucous membranes and no mucosal ulcerations;  normal hard and soft palate   Neck: Trachea midline; FROM, supple, no thyromegaly or lymphadenopathy   Lungs: CTA, with normal respiratory effort and no intercostal retractions   CV: S1-S2 regular, no murmurs, no rub, no gallop   Abdomen: Soft, non-tender; no masses or HSM   Extremities: No peripheral edema or extremity lymphadenopathy  Skin: Normal temperature, turgor and texture; no rash, ulcers or subcutaneous nodules   Psych: Appropriate affect, alert and oriented to person, place and time           Cardiographics  @Procedures    Echocardiogram:    Interpretation Summary        · Patient's BMI is 50.4 kg/m2..  · Left ventricular ejection fraction is normal (Calculated EF = 60%).  · Findings consistent with a normal ECG stress test.  · Myocardial perfusion imaging indicates a normal myocardial perfusion study with no evidence of ischemia.  · Breast attenuation artifact is present.  · Impressions are consistent with a low risk study.         Current Outpatient Prescriptions:   •  albuterol (PROVENTIL HFA;VENTOLIN HFA) 108 (90 BASE) MCG/ACT inhaler, Inhale 2 puffs Every 4 (Four) Hours As Needed for wheezing., Disp: , Rfl:   •  bisoprolol-hydrochlorothiazide (ZIAC) 5-6.25 MG per tablet, Take 1 tablet by mouth Daily., Disp: 30 tablet, Rfl: 6  •  buPROPion XL (WELLBUTRIN XL) 300 MG 24 hr tablet, Take 300 mg by mouth Every Night., Disp: , Rfl: 1  •  dicyclomine (BENTYL) 20 MG tablet, TAKE 1 TABLET BY MOUTH EVERY 6 (SIX) HOURS AS NEEDED (CRAMPING, DIARRHEA) FOR UP TO 30 DAYS., Disp: 90 tablet, Rfl: 3  •  DiphenhydrAMINE HCl (BENADRYL ALLERGY PO), Take 75 mg by mouth At Night As Needed., Disp: , Rfl:   •  FLUoxetine (PROzac) 20 MG capsule, Take 1 capsule by mouth Every Night., Disp: 90 capsule, Rfl: 3  •  ibuprofen (ADVIL) 200 MG tablet, Take 800 mg by mouth Every 6 (Six) Hours As Needed for mild  pain (1-3)., Disp: , Rfl:   •  Omeprazole Magnesium (PRILOSEC OTC PO), Take  by mouth., Disp: , Rfl:   •  ondansetron ODT (ZOFRAN ODT) 4 MG disintegrating tablet, Take 1 tablet by mouth Every 8 (Eight) Hours As Needed for Nausea or Vomiting., Disp: 12 tablet, Rfl: 0  •  rosuvastatin (CRESTOR) 20 MG tablet, Take 1 tablet by mouth Daily., Disp: 30 tablet, Rfl: 3  •  traZODone (DESYREL) 150 MG tablet, Take 150 mg by mouth Every Night., Disp: , Rfl: 3  •  zolpidem (AMBIEN) 10 MG tablet, TAKE ONE TAB BY MOUTH AT BEDTIME, Disp: 30 tablet, Rfl: 0   Assessment:        Patient Active Problem List   Diagnosis   • Blues   • Can't get food down   • Esophagitis, reflux   • Cannot sleep   • LBP (low back pain)   • Breast lump   • Headache, migraine   • Avitaminosis D   • Otitis media follow-up, not resolved   • Diarrhea   • Eczema   • Routine general medical examination at a health care facility   • Hyperlipemia   • Class 1 obesity due to excess calories without serious comorbidity with body mass index (BMI) of 30.0 to 30.9 in adult   • Palpitation   • Chest pain               Plan:            ICD-10-CM ICD-9-CM   1. Hyperlipidemia, unspecified hyperlipidemia type E78.5 272.4   2. Palpitation R00.2 785.1     1. Hyperlipidemia, unspecified hyperlipidemia type  .[id number]      2. Palpitation  Under control       SEE US 6 MONTHS  COUNSELING:    Roxie Vang was given to patient for the following topics: diagnostic results, risk factor reductions, impressions, risks and benefits of treatment options and importance of treatment compliance .       SMOKING COUNSELING:    Counseling given: Not Answered      EMR Dragon/Transcription disclaimer:   Much of this encounter note is an electronic transcription/translation of spoken language to printed text. The electronic translation of spoken language may permit erroneous, or at times, nonsensical words or phrases to be inadvertently transcribed; Although I have reviewed the note  for such errors, some may still exist.

## 2018-04-06 DIAGNOSIS — E78.5 HYPERLIPIDEMIA, UNSPECIFIED HYPERLIPIDEMIA TYPE: Primary | ICD-10-CM

## 2018-04-06 RX ORDER — SIMVASTATIN 20 MG
20 TABLET ORAL NIGHTLY
Qty: 90 TABLET | Refills: 3 | Status: SHIPPED | OUTPATIENT
Start: 2018-04-06 | End: 2019-03-04 | Stop reason: HOSPADM

## 2018-04-12 RX ORDER — SIMVASTATIN 20 MG
20 TABLET ORAL NIGHTLY
Qty: 90 TABLET | Refills: 3 | Status: SHIPPED | OUTPATIENT
Start: 2018-04-12

## 2018-10-03 RX ORDER — BISOPROLOL FUMARATE AND HYDROCHLOROTHIAZIDE 5; 6.25 MG/1; MG/1
1 TABLET ORAL DAILY
Qty: 30 TABLET | Refills: 6 | Status: SHIPPED | OUTPATIENT
Start: 2018-10-03 | End: 2019-03-04 | Stop reason: HOSPADM

## 2019-03-01 ENCOUNTER — PREP FOR SURGERY (OUTPATIENT)
Dept: OTHER | Facility: HOSPITAL | Age: 44
End: 2019-03-01

## 2019-03-01 DIAGNOSIS — Z87.19 HISTORY OF BARRETT'S ESOPHAGUS: ICD-10-CM

## 2019-03-01 DIAGNOSIS — R13.10 DYSPHAGIA: Primary | ICD-10-CM

## 2019-03-04 ENCOUNTER — ANESTHESIA (OUTPATIENT)
Dept: GASTROENTEROLOGY | Facility: HOSPITAL | Age: 44
End: 2019-03-04

## 2019-03-04 ENCOUNTER — ANESTHESIA EVENT (OUTPATIENT)
Dept: GASTROENTEROLOGY | Facility: HOSPITAL | Age: 44
End: 2019-03-04

## 2019-03-04 ENCOUNTER — HOSPITAL ENCOUNTER (OUTPATIENT)
Facility: HOSPITAL | Age: 44
Setting detail: HOSPITAL OUTPATIENT SURGERY
Discharge: HOME | End: 2019-03-04
Attending: SURGERY | Admitting: SURGERY

## 2019-03-04 VITALS
HEART RATE: 83 BPM | OXYGEN SATURATION: 96 % | DIASTOLIC BLOOD PRESSURE: 90 MMHG | WEIGHT: 293 LBS | BODY MASS INDEX: 45.99 KG/M2 | RESPIRATION RATE: 16 BRPM | HEIGHT: 67 IN | SYSTOLIC BLOOD PRESSURE: 144 MMHG | TEMPERATURE: 98.4 F

## 2019-03-04 DIAGNOSIS — R13.10 DYSPHAGIA: ICD-10-CM

## 2019-03-04 DIAGNOSIS — Z87.19 HISTORY OF BARRETT'S ESOPHAGUS: ICD-10-CM

## 2019-03-04 PROCEDURE — 25010000002 PROPOFOL 10 MG/ML EMULSION: Performed by: ANESTHESIOLOGY

## 2019-03-04 PROCEDURE — 88305 TISSUE EXAM BY PATHOLOGIST: CPT | Performed by: SURGERY

## 2019-03-04 RX ORDER — PROPOFOL 10 MG/ML
VIAL (ML) INTRAVENOUS AS NEEDED
Status: DISCONTINUED | OUTPATIENT
Start: 2019-03-04 | End: 2019-03-04 | Stop reason: SURG

## 2019-03-04 RX ORDER — PROPOFOL 10 MG/ML
VIAL (ML) INTRAVENOUS CONTINUOUS PRN
Status: DISCONTINUED | OUTPATIENT
Start: 2019-03-04 | End: 2019-03-04 | Stop reason: SURG

## 2019-03-04 RX ORDER — SODIUM CHLORIDE, SODIUM LACTATE, POTASSIUM CHLORIDE, CALCIUM CHLORIDE 600; 310; 30; 20 MG/100ML; MG/100ML; MG/100ML; MG/100ML
1000 INJECTION, SOLUTION INTRAVENOUS CONTINUOUS
Status: DISCONTINUED | OUTPATIENT
Start: 2019-03-04 | End: 2019-03-04 | Stop reason: HOSPADM

## 2019-03-04 RX ORDER — LIDOCAINE HYDROCHLORIDE 20 MG/ML
INJECTION, SOLUTION INFILTRATION; PERINEURAL AS NEEDED
Status: DISCONTINUED | OUTPATIENT
Start: 2019-03-04 | End: 2019-03-04 | Stop reason: SURG

## 2019-03-04 RX ADMIN — PROPOFOL 160 MCG/KG/MIN: 10 INJECTION, EMULSION INTRAVENOUS at 09:28

## 2019-03-04 RX ADMIN — SODIUM CHLORIDE, POTASSIUM CHLORIDE, SODIUM LACTATE AND CALCIUM CHLORIDE 1000 ML: 600; 310; 30; 20 INJECTION, SOLUTION INTRAVENOUS at 09:14

## 2019-03-04 RX ADMIN — PROPOFOL 100 MG: 10 INJECTION, EMULSION INTRAVENOUS at 09:27

## 2019-03-04 RX ADMIN — LIDOCAINE HYDROCHLORIDE 100 MG: 20 INJECTION, SOLUTION INFILTRATION; PERINEURAL at 09:27

## 2019-03-04 NOTE — H&P
Patient     Name  STACI GROSS (45yo, F) ID# [id number]  Appt. Date/Time  2019 02:00PM      1975  Service Dept.  Main Office    Provider  SHAYLA PIZAON MD    Insurance  Med Primary: [facility] (MEDICAID REPLACEMENT - HMO)  Insurance # : [id number]  Policy/Group # : [id number]   Prescription: ESI1 - Member is eligible. details    Chief Complaint   reflux/GERD   Patient's Care Team   Primary Care Provider: DEIDRA CHRISTINA RNP: [id number], [place] [address], Ph [phone], Fax [phone]   Vitals   Ht:  5 ft 5 in    Wt:  335 lbs    BMI:  55.7     Allergies     None recorded.    Medications        bisoprolol 5 mg-hydrochlorothiazide 6.25 mg tablet  19 filled  [facility]    buPROPion HCl  mg 24 hr tablet, extended release  19 filled  [facility]    cyclobenzaprine 10 mg tablet  18 filled  [facility]    Flucelvax Quad 3155-9077 (PF) 60 mcg (15 mcg x 4)/0.5 mL IM syringe  18 filled  [facility]    FLUoxetine 20 mg capsule  19 filled  [facility]    omeprazole 20 mg capsule,delayed release   Take 1 capsule(s) twice a day by oral route.  19 entered  Shayla Pizano MD    simvastatin 20 mg tablet  19 filled  [facility]    traZODone 150 mg tablet  19 filled  [facility]    Vaqta (PF) 50 unit/mL intramuscular syringe  18 filled  [facility]    zolpidem 10 mg tablet  19 filled  [facility]      Family History   Reviewed Family History    Social History   Reviewed Social History  General Surgery  Alcohol intake: None  Occupation:   Marital status: Single  Smoking Status: Never smoker     Surgical History   Reviewed Surgical History  Egd biopsy single/multiple - 2017 - [facility]   Laparoscopic cholecystectomy - 2016 - [facility]   Hysterectomy - 2012     Past Medical History   Reviewed Past Medical History  Anxiety Disorder: Y  Depression: Y  High Cholesterol: Y  Morbid Obesity: Y  Reflux/GERD: Y    HPI     This morbidly overweight 44-year-old young lady comes in to  discuss worsening reflux disease. She has had reflux for years and although she is made a valiant effort to lose weight she has not been very successful. Her symptoms persisted to the point that she had an upper endoscopy a year and a half ago which confirmed García's esophagus without dysplasia. She was put on pantoprazole 40 mg daily and did quite well. Unfortunately, her insurance changed and she cannot see her doctor anymore and she is gone back to just over-the-counter omeprazole once to twice daily with occasional breakthrough over-the-counter antacids. Her symptoms are not controlled. She is started developing mild trouble swallowing of late as well. She notices the symptoms are worsened with spicy fatty greasy meals but her gallbladder was removed almost 3 years ago. She is never had a colonoscopy before. She denies blood from above or below. She has no family history of GI disease or cancer.    ROS   Patient reports no chest pain and no palpitations; no syncope. She reports abdominal pain, nausea, and GERD but reports no vomiting, no constipation, normal appetite, and no diarrhea; no rectal bleeding, no change in bowel movements. She reports no fever, no significant weight loss, and no chills. She reports no vision change. She reports no difficulty hearing. She reports no sore throat. She reports no wheezing and no shortness of breath. She reports no incontinence, no difficulty urinating, no hematuria, and no increased frequency. She reports no arthralgias/joint pain, no back pain, and no neck pain. She reports no jaundice and no rashes. She reports no numbness, no dizziness, no headaches, and no tremor. She reports no fatigue. She reports no swollen glands, no bruising, and no excessive bleeding.     Physical Exam     Patient is a 44-year-old female.   Constitutional: General Appearance: healthy-appearing.   Eyes: Pupils: PERRLA.   Neck: Neck: supple.   Lungs: Auscultation: breath sounds normal.    Cardiovascular: Heart Auscultation: RRR.   Abdomen: Inspection and Palpation: no tenderness or masses and soft and non-distended.   Musculoskeletal:: Joints, Bones, and Muscles: normal movement of all extremities.     Assessment / Plan     Patient presents today with a known history of García's esophagus and worsening reflux complaints and even trouble swallowing. A repeat upper endoscopy is indicated and perhaps we can try to get her back on her stronger prescription strength medication plus or minus adding Carafate to control her symptoms. Further recommendations will depend on the findings of the study.  1. History of García's esophagus   Z87.19: Personal history of other diseases of the digestive system   2. Gastroesophageal reflux disease without esophagitis   K21.9: Gastro-esophageal reflux disease without esophagitis   3. Dysphagia   R13.10: Dysphagia, unspecified     Return to Office  None recorded.   Encounter Sign-Off     Encounter signed-off by Jason Pizano

## 2019-03-04 NOTE — DISCHARGE INSTRUCTIONS
For the next 24 hours patient needs to be with a responsible adult.    For 24 hours DO NOT drive, operate machinery, appliances, drink alcohol, make important decisions or sign legal documents.    Start with a light or bland diet if you are feeling sick to your stomach otherwise advance to regular diet as tolerated.    Follow recommendations on procedure report if provided by your doctor.    Call Dr. Gongora for problems [phone]    Problems may include but not limited to: large amounts of bleeding, trouble breathing, repeated vomiting, severe unrelieved pain, fever or chills.

## 2019-03-04 NOTE — ANESTHESIA POSTPROCEDURE EVALUATION
Patient: Roxie Dickson    Procedure Summary     Date:  03/04/19 Room / Location:   [facility]    Anesthesia Start:  0925 Anesthesia Stop:  0939    Procedure:  ESOPHAGOGASTRODUODENOSCOPY with biopsies (N/A Esophagus) Diagnosis:       Dysphagia      History of García's esophagus      (Dysphagia [R13.10])      (History of García's esophagus [Z87.19])    Surgeon:  Jason Pizano MD Provider:  Wendy Tyler MD    Anesthesia Type:  MAC ASA Status:  3          Anesthesia Type: MAC  Last vitals  BP   147/92 (03/04/19 0939)   Temp   36.9 °C (98.4 °F) (03/04/19 0851)   Pulse   90 (03/04/19 0939)   Resp   16 (03/04/19 0939)     SpO2   95 % (03/04/19 0939)     Post Anesthesia Care and Evaluation    Patient location during evaluation: PHASE II  Patient participation: complete - patient participated  Level of consciousness: sleepy but conscious  Pain management: adequate  Airway patency: patent  Anesthetic complications: No anesthetic complications    Cardiovascular status: acceptable  Respiratory status: acceptable  Hydration status: acceptable

## 2019-03-04 NOTE — ANESTHESIA PREPROCEDURE EVALUATION
Anesthesia Evaluation     Patient summary reviewed and Nursing notes reviewed   history of anesthetic complications: PONV  NPO Solid Status: > 8 hours  NPO Liquid Status: > 2 hours           Airway   Mallampati: II  TM distance: >3 FB  Neck ROM: full  Dental - normal exam     Pulmonary - negative pulmonary ROS and normal exam   Cardiovascular - normal exam  Exercise tolerance: good (4-7 METS)    (+) hypertension, hyperlipidemia,       Neuro/Psych  (+) headaches, psychiatric history Depression and Anxiety,     GI/Hepatic/Renal/Endo    (+) obesity, morbid obesity, GERD,      Musculoskeletal (-) negative ROS    Abdominal  - normal exam    Bowel sounds: normal.   Substance History - negative use     OB/GYN negative ob/gyn ROS         Other                        Anesthesia Plan    ASA 3     MAC     Anesthetic plan, all risks, benefits, and alternatives have been provided, discussed and informed consent has been obtained with: patient.    Plan discussed with CRNA and attending.

## 2019-03-05 LAB
CYTO UR: NORMAL
LAB AP CASE REPORT: NORMAL
PATH REPORT.FINAL DX SPEC: NORMAL
PATH REPORT.GROSS SPEC: NORMAL

## 2019-04-01 RX ORDER — SIMVASTATIN 20 MG
20 TABLET ORAL NIGHTLY
Qty: 90 TABLET | Refills: 3 | Status: SHIPPED | OUTPATIENT
Start: 2019-04-01

## 2019-05-02 RX ORDER — BISOPROLOL FUMARATE AND HYDROCHLOROTHIAZIDE 5; 6.25 MG/1; MG/1
1 TABLET ORAL DAILY
Qty: 30 TABLET | Refills: 6 | OUTPATIENT
Start: 2019-05-02

## 2020-03-18 RX ORDER — SIMVASTATIN 20 MG
20 TABLET ORAL NIGHTLY
Qty: 30 TABLET | Refills: 11 | OUTPATIENT
Start: 2020-03-18

## 2021-05-05 ENCOUNTER — OFFICE VISIT (OUTPATIENT)
Dept: GASTROENTEROLOGY | Facility: CLINIC | Age: 46
End: 2021-05-05

## 2021-05-05 VITALS
SYSTOLIC BLOOD PRESSURE: 140 MMHG | BODY MASS INDEX: 45.99 KG/M2 | WEIGHT: 293 LBS | DIASTOLIC BLOOD PRESSURE: 80 MMHG | HEIGHT: 67 IN

## 2021-05-05 DIAGNOSIS — K59.1 FUNCTIONAL DIARRHEA: Primary | Chronic | ICD-10-CM

## 2021-05-05 DIAGNOSIS — K21.00 GASTROESOPHAGEAL REFLUX DISEASE WITH ESOPHAGITIS WITHOUT HEMORRHAGE: Chronic | ICD-10-CM

## 2021-05-05 DIAGNOSIS — Z87.19 HISTORY OF BARRETT'S ESOPHAGUS: Chronic | ICD-10-CM

## 2021-05-05 DIAGNOSIS — Z80.0 FAMILY HISTORY OF COLON CANCER IN MOTHER: ICD-10-CM

## 2021-05-05 PROCEDURE — 99204 OFFICE O/P NEW MOD 45 MIN: CPT | Performed by: INTERNAL MEDICINE

## 2021-05-05 RX ORDER — SODIUM CHLORIDE, SODIUM LACTATE, POTASSIUM CHLORIDE, CALCIUM CHLORIDE 600; 310; 30; 20 MG/100ML; MG/100ML; MG/100ML; MG/100ML
30 INJECTION, SOLUTION INTRAVENOUS CONTINUOUS
Status: CANCELLED | OUTPATIENT
Start: 2021-06-22

## 2021-05-05 RX ORDER — ROSUVASTATIN CALCIUM 10 MG/1
10 TABLET, COATED ORAL EVERY EVENING
COMMUNITY
Start: 2021-04-25

## 2021-05-05 RX ORDER — MELOXICAM 15 MG/1
TABLET ORAL
COMMUNITY
Start: 2021-04-09

## 2021-05-05 RX ORDER — NICOTINE POLACRILEX 4 MG/1
1 GUM, CHEWING ORAL
Qty: 180 EACH | Refills: 3 | Status: SHIPPED | OUTPATIENT
Start: 2021-05-05

## 2021-05-05 NOTE — PROGRESS NOTES
Chief Complaint   Patient presents with   • Abdominal Pain       Subjective     HPI    Roxie Dickson is a 46 y.o. female with a past medical history noted below who presents for diarrhea, history of García's esophagus and GERD.      She was last seen in 2017.    She reports reflux symptoms are controlled with twice daily PPI.  She buys this over-the-counter--either Nexium or omeprazole.  Denies dysphagia    Reports daily diarrhea.  She has at least 3 bowel movements every morning and then finally will take 1-2 Imodium tablets.  This will stop further episodes of diarrhea but then it begins in again the next day.    She has had some weight gain and her BMI is now above 53.    Mother just diagnosed with colon cancer last year.  She is getting chemo    Hx of eleazar    No smoking.  No excess ETOH.    Works as a         Today's visit was in the office.  Both the patient and I were wearing face masks and proper hand hygiene was performed before and after the physical exam.           Current Outpatient Medications:   •  albuterol (PROVENTIL HFA;VENTOLIN HFA) 108 (90 BASE) MCG/ACT inhaler, Inhale 2 puffs Every 4 (Four) Hours As Needed for wheezing., Disp: , Rfl:   •  buPROPion XL (WELLBUTRIN XL) 300 MG 24 hr tablet, Take 300 mg by mouth Every Night., Disp: , Rfl: 1  •  DiphenhydrAMINE HCl (BENADRYL ALLERGY PO), Take 75 mg by mouth At Night As Needed., Disp: , Rfl:   •  esomeprazole (nexIUM) 20 MG capsule, Take 20 mg by mouth Every Morning Before Breakfast., Disp: , Rfl:   •  FLUoxetine (PROzac) 20 MG capsule, Take 1 capsule by mouth Every Night., Disp: 90 capsule, Rfl: 3  •  ibuprofen (ADVIL) 200 MG tablet, Take 800 mg by mouth Every 6 (Six) Hours As Needed for mild pain (1-3)., Disp: , Rfl:   •  meloxicam (MOBIC) 15 MG tablet, 1 EVERY MORNING, Disp: , Rfl:   •  Omeprazole Magnesium (PRILOSEC OTC PO), Take  by mouth., Disp: , Rfl:   •  rosuvastatin (CRESTOR) 10 MG tablet, Take 10 mg by mouth Every Evening.,  Disp: , Rfl:   •  traZODone (DESYREL) 150 MG tablet, Take 150 mg by mouth Every Night., Disp: , Rfl: 3  •  zolpidem (AMBIEN) 10 MG tablet, TAKE ONE TAB BY MOUTH AT BEDTIME, Disp: 30 tablet, Rfl: 0  •  Omeprazole 20 MG tablet delayed-release, Take 20 mg by mouth 2 (Two) Times a Day Before Meals., Disp: 180 each, Rfl: 3  •  simvastatin (ZOCOR) 20 MG tablet, Take 1 tablet by mouth Every Night., Disp: 90 tablet, Rfl: 3  •  simvastatin (ZOCOR) 20 MG tablet, TAKE 1 TABLET BY MOUTH EVERY NIGHT., Disp: 90 tablet, Rfl: 3      Objective     Vitals:    05/05/21 1524   BP: 140/80         05/05/21  1524   Weight: (!) 155 kg (341 lb)     Body mass index is 53.41 kg/m².    Physical Exam  Vitals reviewed.   Constitutional:       General: She is not in acute distress.     Appearance: Normal appearance. She is well-developed. She is obese. She is not diaphoretic.   HENT:      Head: Normocephalic.   Neck:      Thyroid: No thyromegaly.   Cardiovascular:      Rate and Rhythm: Normal rate and regular rhythm.   Pulmonary:      Effort: Pulmonary effort is normal. No respiratory distress.      Breath sounds: Normal breath sounds. No wheezing.   Abdominal:      General: Bowel sounds are normal. There is no distension.      Palpations: Abdomen is soft. Abdomen is not rigid. There is no mass.      Tenderness: There is no abdominal tenderness. There is no guarding or rebound.      Hernia: No hernia is present.   Genitourinary:     Comments: Rectal exam deferred  Musculoskeletal:         General: No tenderness.   Neurological:      Mental Status: She is alert and oriented to person, place, and time.      Motor: No atrophy.      Coordination: Coordination normal.   Psychiatric:         Behavior: Behavior normal.         Thought Content: Thought content normal.         Judgment: Judgment normal.             WBC   Date Value Ref Range Status   08/08/2020 9.98 4.5 - 11.0 10*3/uL Final     RBC   Date Value Ref Range Status   08/08/2020 4.08 4.0 - 5.2  10*6/uL Final     Hemoglobin   Date Value Ref Range Status   08/08/2020 10.9 (L) 12.0 - 16.0 g/dL Final     Hematocrit   Date Value Ref Range Status   08/08/2020 34.6 (L) 36.0 - 46.0 % Final     MCV   Date Value Ref Range Status   08/08/2020 84.8 80.0 - 100.0 fL Final     MCH   Date Value Ref Range Status   08/08/2020 26.7 26.0 - 34.0 pg Final     MCHC   Date Value Ref Range Status   08/08/2020 31.5 31.0 - 37.0 g/dL Final     RDW   Date Value Ref Range Status   08/08/2020 14.8 12.0 - 16.8 % Final     RDW-SD   Date Value Ref Range Status   12/14/2016 44.9 37.0 - 54.0 fl Final     MPV   Date Value Ref Range Status   08/08/2020 11.0 8.4 - 12.4 fL Final     Platelets   Date Value Ref Range Status   08/08/2020 228 140 - 440 10*3/uL Final     Neutrophil Rel %   Date Value Ref Range Status   08/08/2020 82.9 (H) 45 - 80 % Final     Lymphocyte Rel %   Date Value Ref Range Status   08/08/2020 10.7 (L) 15 - 50 % Final     Monocyte Rel %   Date Value Ref Range Status   08/08/2020 5.8 0 - 15 % Final     Eosinophil %   Date Value Ref Range Status   08/08/2020 0.0 0 - 7 % Final     Basophil Rel %   Date Value Ref Range Status   08/08/2020 0.1 0 - 2 % Final     Immature Grans %   Date Value Ref Range Status   08/08/2020 0.5 0.0 - 1.0 % Final     Neutrophils Absolute   Date Value Ref Range Status   08/08/2020 8.27 2.0 - 8.8 10*3/uL Final     Lymphocytes Absolute   Date Value Ref Range Status   08/08/2020 1.07 0.7 - 5.5 10*3/uL Final     Monocytes Absolute   Date Value Ref Range Status   08/08/2020 0.58 0.0 - 1.7 10*3/uL Final     Eosinophils Absolute   Date Value Ref Range Status   08/08/2020 0.00 0.0 - 0.8 10*3/uL Final     Basophils Absolute   Date Value Ref Range Status   08/08/2020 0.01 0.0 - 0.2 10*3/uL Final     Immature Grans, Absolute   Date Value Ref Range Status   08/08/2020 0.05 0.00 - 0.10 10*3/uL Final     nRBC   Date Value Ref Range Status   08/08/2020 0 0 /100(WBC) Final       Lab Results   Component Value Date     GLUCOSE 102 (H) 12/14/2016    BUN 18 08/08/2020    CREATININE 0.7 08/08/2020    EGFRIFNONA 81 05/15/2017    EGFRIFAFRI 99 05/15/2017    BCR 25.7 08/08/2020    CO2 27 08/08/2020    CALCIUM 9.1 08/08/2020    PROTENTOTREF 6.4 05/15/2017    ALBUMIN 4.0 08/08/2020    LABIL2 1.4 08/08/2020    AST 21 08/08/2020    ALT 24 08/08/2020     RIGHT UPPER QUADRANT SONOGRAM     HISTORY: Abdominal pain for several months. Prior hysterectomy.     TECHNIQUE: Sonogram of the right upper quadrant was performed in  standard fashion.     FINDINGS: The small part of the pancreas which is visualized appears  normal. The liver demonstrates some slight hyperechogenicity in  comparison to the echogenicity of the right kidney. This suggests  hepatic steatosis. No focal hepatic lesion is identified. The  gallbladder is in situ and contains numerous calculi. The largest  measures about 1 cm diameter. There is no gallbladder wall thickening  nor any haroon-cholecystic fluid. The common bile duct measures 5 mm.  There is no sonographic Webster's sign. The right kidney appears normal  and measures about 11.8 cm long.     IMPRESSION:  Cholelithiasis without evidence of cholecystitis or biliary  obstruction     This report was finalized on 12/7/2016 2:55 PM by Dr. Hitesh Chen MD.    I personally reviewed data as detailed below:     The labs listed above.    The radiology studies listed above.    Office notes from: 2017    Endoscopy procedures and pathology from: 6/28/17 EGD, 3/4/19 EGD with Dr Gongora      No notes on file    Assessment/Plan    1. Diarrhea: Chronic issue for her.  Suspected IBS.  Response to Imodium    2.  Personal history of García's esophagus: Nondysplastic.    3.  GERD: Stable on PPI    4.  Family history of colon cancer: Her mother was just diagnosed.  Her mother is age 68    Plan  Continue PPI  Plan for colonoscopy for family history of colon cancer, diarrhea  EGD at same time for history of García's  Will try to see if we get  her prescription PPI to reduce cost    Diagnoses and all orders for this visit:    1. Functional diarrhea (Primary)    2. History of García's esophagus  -     Case Request; Standing  -     Follow Anesthesia Guidelines / Standing Orders; Future  -     Obtain Informed Consent; Future  -     Implement Anesthesia Orders Day of Procedure; Standing  -     Obtain Informed Consent; Standing  -     lactated ringers infusion  -     Case Request    3. Gastroesophageal reflux disease with esophagitis without hemorrhage  -     Case Request; Standing  -     Follow Anesthesia Guidelines / Standing Orders; Future  -     Obtain Informed Consent; Future  -     Implement Anesthesia Orders Day of Procedure; Standing  -     Obtain Informed Consent; Standing  -     lactated ringers infusion  -     Case Request    4. Family history of colon cancer in mother  -     Case Request; Standing  -     Follow Anesthesia Guidelines / Standing Orders; Future  -     Obtain Informed Consent; Future  -     Implement Anesthesia Orders Day of Procedure; Standing  -     Obtain Informed Consent; Standing  -     lactated ringers infusion  -     Case Request    Other orders  -     Omeprazole 20 MG tablet delayed-release; Take 20 mg by mouth 2 (Two) Times a Day Before Meals.  Dispense: 180 each; Refill: 3        I have discussed the above plan with the patient.  They verbalize understanding and are in agreement with the plan.  They have been advised to contact the office for any questions, concerns, or changes related to their health.    Dictated utilizing Dragon dictation

## 2021-06-10 ENCOUNTER — TRANSCRIBE ORDERS (OUTPATIENT)
Dept: SLEEP MEDICINE | Facility: HOSPITAL | Age: 46
End: 2021-06-10

## 2021-06-10 DIAGNOSIS — Z01.818 OTHER SPECIFIED PRE-OPERATIVE EXAMINATION: Primary | ICD-10-CM

## 2021-06-19 ENCOUNTER — LAB (OUTPATIENT)
Dept: LAB | Facility: HOSPITAL | Age: 46
End: 2021-06-19

## 2021-06-19 DIAGNOSIS — Z01.818 OTHER SPECIFIED PRE-OPERATIVE EXAMINATION: ICD-10-CM

## 2021-06-19 LAB — SARS-COV-2 ORF1AB RESP QL NAA+PROBE: NOT DETECTED

## 2021-06-19 PROCEDURE — C9803 HOPD COVID-19 SPEC COLLECT: HCPCS

## 2021-06-19 PROCEDURE — U0004 COV-19 TEST NON-CDC HGH THRU: HCPCS

## 2021-06-22 ENCOUNTER — ANESTHESIA (OUTPATIENT)
Dept: GASTROENTEROLOGY | Facility: HOSPITAL | Age: 46
End: 2021-06-22

## 2021-06-22 ENCOUNTER — HOSPITAL ENCOUNTER (OUTPATIENT)
Facility: HOSPITAL | Age: 46
Setting detail: HOSPITAL OUTPATIENT SURGERY
Discharge: HOME | End: 2021-06-22
Attending: INTERNAL MEDICINE | Admitting: INTERNAL MEDICINE

## 2021-06-22 ENCOUNTER — ANESTHESIA EVENT (OUTPATIENT)
Dept: GASTROENTEROLOGY | Facility: HOSPITAL | Age: 46
End: 2021-06-22

## 2021-06-22 VITALS
RESPIRATION RATE: 18 BRPM | HEIGHT: 67 IN | OXYGEN SATURATION: 95 % | SYSTOLIC BLOOD PRESSURE: 122 MMHG | DIASTOLIC BLOOD PRESSURE: 71 MMHG | BODY MASS INDEX: 45.99 KG/M2 | HEART RATE: 71 BPM | WEIGHT: 293 LBS

## 2021-06-22 DIAGNOSIS — Z80.0 FAMILY HISTORY OF COLON CANCER IN MOTHER: ICD-10-CM

## 2021-06-22 DIAGNOSIS — Z87.19 HISTORY OF BARRETT'S ESOPHAGUS: ICD-10-CM

## 2021-06-22 DIAGNOSIS — K21.00 GASTROESOPHAGEAL REFLUX DISEASE WITH ESOPHAGITIS WITHOUT HEMORRHAGE: ICD-10-CM

## 2021-06-22 PROCEDURE — 45385 COLONOSCOPY W/LESION REMOVAL: CPT | Performed by: INTERNAL MEDICINE

## 2021-06-22 PROCEDURE — 25010000002 PROPOFOL 10 MG/ML EMULSION: Performed by: ANESTHESIOLOGY

## 2021-06-22 PROCEDURE — 88305 TISSUE EXAM BY PATHOLOGIST: CPT | Performed by: INTERNAL MEDICINE

## 2021-06-22 PROCEDURE — S0260 H&P FOR SURGERY: HCPCS | Performed by: INTERNAL MEDICINE

## 2021-06-22 PROCEDURE — 43239 EGD BIOPSY SINGLE/MULTIPLE: CPT | Performed by: INTERNAL MEDICINE

## 2021-06-22 RX ORDER — PROPOFOL 10 MG/ML
VIAL (ML) INTRAVENOUS AS NEEDED
Status: DISCONTINUED | OUTPATIENT
Start: 2021-06-22 | End: 2021-06-22 | Stop reason: SURG

## 2021-06-22 RX ORDER — GLYCOPYRROLATE 0.2 MG/ML
INJECTION INTRAMUSCULAR; INTRAVENOUS AS NEEDED
Status: DISCONTINUED | OUTPATIENT
Start: 2021-06-22 | End: 2021-06-22 | Stop reason: SURG

## 2021-06-22 RX ORDER — PROPOFOL 10 MG/ML
VIAL (ML) INTRAVENOUS CONTINUOUS PRN
Status: DISCONTINUED | OUTPATIENT
Start: 2021-06-22 | End: 2021-06-22 | Stop reason: SURG

## 2021-06-22 RX ORDER — SODIUM CHLORIDE, SODIUM LACTATE, POTASSIUM CHLORIDE, CALCIUM CHLORIDE 600; 310; 30; 20 MG/100ML; MG/100ML; MG/100ML; MG/100ML
30 INJECTION, SOLUTION INTRAVENOUS CONTINUOUS
Status: DISCONTINUED | OUTPATIENT
Start: 2021-06-22 | End: 2021-06-22 | Stop reason: HOSPADM

## 2021-06-22 RX ORDER — LIDOCAINE HYDROCHLORIDE 20 MG/ML
INJECTION, SOLUTION INFILTRATION; PERINEURAL AS NEEDED
Status: DISCONTINUED | OUTPATIENT
Start: 2021-06-22 | End: 2021-06-22 | Stop reason: SURG

## 2021-06-22 RX ADMIN — PROPOFOL 180 MCG/KG/MIN: 10 INJECTION, EMULSION INTRAVENOUS at 10:18

## 2021-06-22 RX ADMIN — GLYCOPYRROLATE 0.1 MG: 1 INJECTION INTRAMUSCULAR; INTRAVENOUS at 10:16

## 2021-06-22 RX ADMIN — LIDOCAINE HYDROCHLORIDE 60 MG: 20 INJECTION, SOLUTION INFILTRATION; PERINEURAL at 10:18

## 2021-06-22 RX ADMIN — PROPOFOL 150 MG: 10 INJECTION, EMULSION INTRAVENOUS at 10:18

## 2021-06-22 RX ADMIN — SODIUM CHLORIDE, POTASSIUM CHLORIDE, SODIUM LACTATE AND CALCIUM CHLORIDE 30 ML/HR: 600; 310; 30; 20 INJECTION, SOLUTION INTRAVENOUS at 10:06

## 2021-06-22 NOTE — H&P
[facility], [place], operated by [facility]  Pre Procedure History & Physical    Chief Complaint: Personal history of García's esophagus, family history colon cancer, colon cancer screening      HPI: 46 y.o. female with a past medical history noted below who presents for diarrhea, history of García's esophagus and GERD.       She was last seen in 2017.     She reports reflux symptoms are controlled with twice daily PPI.  She buys this over-the-counter--either Nexium or omeprazole.  Denies dysphagia     Reports daily diarrhea.  She has at least 3 bowel movements every morning and then finally will take 1-2 Imodium tablets.  This will stop further episodes of diarrhea but then it begins in again the next day.     She has had some weight gain and her BMI is now above 53.     Mother just diagnosed with colon cancer last year.  She is getting chemo     Hx of eleazar     No smoking.  No excess ETOH.     Works as a     Past Medical History:   Past Medical History:   Diagnosis Date   • Acid reflux disease    • Allergic    • Anxiety    • Depression    • Diarrhea    • GERD (gastroesophageal reflux disease)    • History of anemia    • History of bronchitis    • History of mononucleosis    • Hypertension    • Insomnia    • Irritable bowel syndrome (IBS)    • Low back pain    • Migraines    • PONV (postoperative nausea and vomiting)     dtr & mom experience nausea/vomiting       Family History:  Family History   Problem Relation Age of Onset   • Hypertension Mother    • Colon cancer Mother    • Cancer Maternal Grandmother         ovarian cancer   • Diverticulitis Maternal Grandmother    • Cancer Brother         leukemia   • Alcohol abuse Father    • COPD Father        Social History:   reports that she has never smoked. She has never used smokeless tobacco. She reports that she does not drink alcohol and does not use drugs.    Medications:   No medications prior to admission.       Allergies:  Latex    ROS:    Pertinent items  are noted in HPI     Objective     There were no vitals taken for this visit.    Physical Exam   Constitutional: Pt is oriented to person, place, and time and well-developed, well-nourished, and in no distress.   HENT:   Mouth/Throat: Oropharynx is clear and moist.   Neck: Normal range of motion. Neck supple.   Cardiovascular: Normal rate, regular rhythm and normal heart sounds.    Pulmonary/Chest: Effort normal and breath sounds normal. No respiratory distress. No  wheezes.   Abdominal: Soft. Bowel sounds are normal.   Skin: Skin is warm and dry.   Psychiatric: Mood, memory, affect and judgment normal.     Assessment/Plan     Diagnosis:  Personal history of García's esophagus, family history colon cancer, colon cancer screening      Anticipated Surgical Procedure:  EGD  Colonoscopy    The risks, benefits, and alternatives of this procedure have been discussed with the patient or the responsible party- the patient understands and agrees to proceed.

## 2021-06-22 NOTE — ANESTHESIA PREPROCEDURE EVALUATION
Anesthesia Evaluation     Patient summary reviewed and Nursing notes reviewed   history of anesthetic complications: PONV  NPO Solid Status: > 8 hours  NPO Liquid Status: > 2 hours           Airway   Mallampati: II  TM distance: >3 FB  Neck ROM: full  Dental - normal exam     Pulmonary - negative pulmonary ROS and normal exam   Cardiovascular - normal exam  Exercise tolerance: good (4-7 METS)    (+) hypertension, hyperlipidemia,       Neuro/Psych  (+) headaches, psychiatric history Depression and Anxiety,     GI/Hepatic/Renal/Endo    (+) morbid obesity, GERD,      Musculoskeletal (-) negative ROS    Abdominal  - normal exam    Bowel sounds: normal.   Substance History - negative use     OB/GYN negative ob/gyn ROS         Other                          Anesthesia Plan    ASA 3     MAC       Anesthetic plan, all risks, benefits, and alternatives have been provided, discussed and informed consent has been obtained with: patient.    Plan discussed with CRNA and attending.

## 2021-06-22 NOTE — ANESTHESIA POSTPROCEDURE EVALUATION
Patient: Roxie Dickson    Procedure Summary     Date: 06/22/21 Room / Location:  [facility]    Anesthesia Start: 1013 Anesthesia Stop: 1052    Procedures:       COLONOSCOPY TO CECUM AND TI WITH COLD SNARE POLYPECTOMY (N/A )      ESOPHAGOGASTRODUODENOSCOPY WITH BX (N/A Esophagus) Diagnosis:       History of García's esophagus      Gastroesophageal reflux disease with esophagitis without hemorrhage      Family history of colon cancer in mother      (History of García's esophagus [Z87.19])      (Gastroesophageal reflux disease with esophagitis without hemorrhage [K21.00])      (Family history of colon cancer in mother [Z80.0])    Surgeons: Melisa Green MD Provider: Berhane Thomas MD    Anesthesia Type: MAC ASA Status: 3          Anesthesia Type: MAC    Vitals  No vitals data found for the desired time range.          Post Anesthesia Care and Evaluation    Patient location during evaluation: PHASE II  Patient participation: waiting for patient participation  Level of consciousness: sleepy but conscious  Pain management: adequate  Airway patency: patent    Cardiovascular status: acceptable  Respiratory status: acceptable  Hydration status: acceptable

## 2021-06-27 NOTE — PROGRESS NOTES
EGD biopsies confirm García's esophagus, no dysplasiaMild gastritis, no bacterial infectionThe colon polyp was a tubular adenomaPlease place in 3-year EGD recall, colonoscopy in 5 years

## 2021-07-13 ENCOUNTER — TELEPHONE (OUTPATIENT)
Dept: GASTROENTEROLOGY | Facility: CLINIC | Age: 46
End: 2021-07-13

## 2021-07-13 NOTE — TELEPHONE ENCOUNTER
----- Message from Melisa Green MD sent at 6/27/2021  3:32 PM EDT -----  EGD biopsies confirm García's esophagus, no dysplasiaMild gastritis, no bacterial infectionThe colon polyp was a tubular adenomaPlease place in 3-year EGD recall, colonoscopy in 5 years

## 2021-07-13 NOTE — TELEPHONE ENCOUNTER
Called pt and advised of Dr Green note. Verb understanding.     Egd placed in recall and hm for 06/22/2024.  C/s placed in recall and hm for 06/22/2026.

## 2022-12-07 ENCOUNTER — APPOINTMENT (OUTPATIENT)
Dept: GENERAL RADIOLOGY | Facility: HOSPITAL | Age: 47
End: 2022-12-07

## 2022-12-07 ENCOUNTER — HOSPITAL ENCOUNTER (EMERGENCY)
Facility: HOSPITAL | Age: 47
Discharge: HOME | End: 2022-12-07
Attending: EMERGENCY MEDICINE | Admitting: EMERGENCY MEDICINE

## 2022-12-07 VITALS
SYSTOLIC BLOOD PRESSURE: 155 MMHG | WEIGHT: 293 LBS | DIASTOLIC BLOOD PRESSURE: 91 MMHG | OXYGEN SATURATION: 95 % | RESPIRATION RATE: 18 BRPM | BODY MASS INDEX: 45.99 KG/M2 | HEIGHT: 67 IN | TEMPERATURE: 97.5 F | HEART RATE: 70 BPM

## 2022-12-07 DIAGNOSIS — S92.514A NONDISPLACED FRACTURE OF PROXIMAL PHALANX OF RIGHT LESSER TOE(S), INITIAL ENCOUNTER FOR CLOSED FRACTURE: Primary | ICD-10-CM

## 2022-12-07 PROCEDURE — 99283 EMERGENCY DEPT VISIT LOW MDM: CPT

## 2022-12-07 PROCEDURE — 73660 X-RAY EXAM OF TOE(S): CPT

## 2022-12-07 NOTE — ED NOTES
Pt arrives via PV, ambulatory to triage desk with assist of cane.  Pt reports stubbing R 5th toe on cardboard box last night. Pt reports toe nail is falling off. Bleeding controlled at this time. Pain 2/10; with movement 5/10.

## 2022-12-07 NOTE — ED PROVIDER NOTES
EMERGENCY DEPARTMENT ENCOUNTER    Room Number: B01/01  Date Seen: 12/7/2022  Time Seen: 11:17 EST  PCP: Tiarra Panda APRN    Historian: Patient      HISTORY OF PRESENT ILLNESS    Chief Complaint: Toe injury    Context: Roxie Dickson is a 47 y.o. female who presents to the ED with c/o right little toe pain after stubbing on a cardboard box last night.  Patient reports some bleeding to the nail which has stopped since the injury.  She reports pain to touch and with movement.  She denies previous injury or surgery to the right foot or toe.  She does not take blood thinners.        MEDICAL RECORD REVIEW:    Reviewed in epic    No recent pertinent visits    PAST MEDICAL HISTORY    Active Ambulatory Problems     Diagnosis Date Noted   • Blues 04/06/2016   • Can't get food down 04/06/2016   • Gastroesophageal reflux disease with esophagitis without hemorrhage 04/06/2016   • Cannot sleep 04/06/2016   • LBP (low back pain) 04/06/2016   • Breast lump 04/06/2016   • Headache, migraine 04/06/2016   • Avitaminosis D 04/06/2016   • Otitis media follow-up, not resolved 04/06/2016   • Diarrhea 08/09/2016   • Eczema 08/09/2016   • Routine general medical examination at a health care facility 05/15/2017   • Hyperlipemia 07/24/2017   • Class 1 obesity due to excess calories without serious comorbidity with body mass index (BMI) of 30.0 to 30.9 in adult 03/06/2018   • Palpitation 03/06/2018   • Chest pain 03/06/2018   • Dysphagia 03/01/2019   • History of García's esophagus 03/01/2019   • Family history of colon cancer in mother 05/05/2021     Resolved Ambulatory Problems     Diagnosis Date Noted   • Cerumen impaction 04/06/2016     Past Medical History:   Diagnosis Date   • Acid reflux disease    • Allergic    • Anxiety    • Depression    • GERD (gastroesophageal reflux disease)    • History of anemia    • History of bronchitis    • History of mononucleosis    • Hypertension    • Insomnia    • Irritable bowel syndrome (IBS)     • Migraines    • PONV (postoperative nausea and vomiting)          PAST SURGICAL HISTORY    Past Surgical History:   Procedure Laterality Date   • CARPAL TUNNEL RELEASE Bilateral    • CHOLECYSTECTOMY WITH INTRAOPERATIVE CHOLANGIOGRAM N/A 12/16/2016    Procedure: CHOLECYSTECTOMY LAPAROSCOPIC INTRAOPERATIVE CHOLANGIOGRAM;  Surgeon: Frederick Ortega MD;  Location: [facility] MAIN OR;  Service:    • COLONOSCOPY N/A 6/22/2021    Procedure: COLONOSCOPY TO CECUM AND TI WITH COLD SNARE POLYPECTOMY;  Surgeon: Melisa Green MD;  Location: [facility] ENDOSCOPY;  Service: Gastroenterology;  Laterality: N/A;  FAMILY HX COLON CA, SCREENING  --SKIN TAGS, TRANSVERSE LIPOMA, POLYP, HEMORRHOIDS    • ENDOSCOPY N/A 6/28/2017    Procedure: ESOPHAGOGASTRODUODENOSCOPY WITH COLD BIOPSIES;  Surgeon: Melisa Green MD;  Location: [facility] ENDOSCOPY;  Service:    • ENDOSCOPY N/A 3/4/2019    Procedure: ESOPHAGOGASTRODUODENOSCOPY with biopsies;  Surgeon: Jason Pizano MD;  Location: [facility] ENDOSCOPY;  Service: General   • ENDOSCOPY N/A 6/22/2021    Procedure: ESOPHAGOGASTRODUODENOSCOPY WITH BX;  Surgeon: Melisa Green MD;  Location: [facility] ENDOSCOPY;  Service: Gastroenterology;  Laterality: N/A;  HX TAYLOR'S  --HIATAL HERNIA, IRREGULAR Z-LINE, GASTRITIS    • MANDIBLE FRACTURE SURGERY     • SUBTOTAL HYSTERECTOMY           FAMILY HISTORY    Family History   Problem Relation Age of Onset   • Hypertension Mother    • Colon cancer Mother    • Cancer Maternal Grandmother         ovarian cancer   • Diverticulitis Maternal Grandmother    • Cancer Brother         leukemia   • Alcohol abuse Father    • COPD Father          SOCIAL HISTORY    Social History     Socioeconomic History   • Marital status: Single   Tobacco Use   • Smoking status: Never   • Smokeless tobacco: Never   Substance and Sexual Activity   • Alcohol use: No   • Drug use: No   • Sexual activity: Defer     Birth control/protection: Surgical     Comment: partial hysterectomy          ALLERGIES    Latex      REVIEW OF SYSTEMS    Review of Systems   Constitutional: Negative for diaphoresis and fever.   Musculoskeletal: Positive for arthralgias (Right fifth toe) and back pain (Chronic).   Skin:        Bruising right fifth toe       All systems reviewed and negative except those discussed in HPI.      PHYSICAL EXAM    ED Triage Vitals   Temp Heart Rate Resp BP SpO2   12/07/22 1042 12/07/22 1042 12/07/22 1042 12/07/22 1048 12/07/22 1042   97.5 °F (36.4 °C) 105 18 167/98 96 %      Temp src Heart Rate Source Patient Position BP Location FiO2 (%)   12/07/22 1042 12/07/22 1042 -- -- --   Tympanic Monitor          I have reviewed the triage vital signs and nursing notes.    Constitutional: Well appearing, non distressed  Head: Atraumatic, normocephalic  Neck: No midline tenderness, Full painless ROM  Eyes: No scleral icterus, no scleral injection  ENT: Nares patent  CV: Regular rate, regular rhythm, distal pulses symmetric  Respiratory/Chest: No distress, CTAB  Abdomen: Abdomen soft, nontender  Back: No midline tenderness, Full ROM  Extremities: Pain and bruising to right fifth toe, small amount of blood noted surrounding toenail without significant displacement or any active bleeding, soft compartments, no edema  Skin: Warm, dry, no rash  Neuro: A&Ox4, moves all extremities, follows commands, no focal deficits  Psych: Normal mood        LAB RESULTS    No results found for this or any previous visit (from the past 24 hour(s)).    I ordered the above labs and independently reviewed the results.    RADIOLOGY RESULTS    XR Toe 2+ View Right    Result Date: 12/7/2022  THREE-VIEW RIGHT FIFTH TOE  HISTORY: Trauma. Pain.  FINDINGS: There is a suspicion of a minimal nondisplaced fracture near the head of the fifth proximal phalanx.  This report was finalized on 12/7/2022 12:12 PM by Dr. Jeevan Pimentel M.D.        I ordered the above noted radiological studies and reviewed the images on the PACS system.      PROCEDURES    Splint Application:  Splint Type: Post op shoe and rober tape  Indication: fracture proximal phalanx R 5th toe  Splint placed by ANDREAS  Post splint application:   1) neurovascularly intact   2) good position  Discussed splint care with patient  Discussed PMD/orthopedic follow up        MEDICATIONS GIVEN IN ER    Medications - No data to display      PROGRESS, CONSULTS, and MEDICAL DECISION MAKING    DIFFERENTIAL DIAGNOSIS  Fracture, contusion, sprain       Nondisplaced fracture to proximal phalanx of right fifth toe.  Rober taped and placed in postop shoe.  Small amount of blood to the nail but no ongoing bleeding and nail remains attached.  Will provide orthopedic follow-up.          DIAGNOSIS  Final diagnoses:   Nondisplaced fracture of proximal phalanx of right lesser toe(s), initial encounter for closed fracture       DISPOSITION  ED Disposition     ED Disposition   Discharge    Condition   Stable    Comment   --             FOLLOW UP  Tiarra Panda, APRN  [address] [place] [id number] [phone]    Schedule an appointment as soon as possible for a visit       Guille Parkinson MD  [address] [id number] [phone]      Orthopedic follow up      DISCHARGE RX     Medication List      No changes were made to your prescriptions during this visit.             Patient was placed in face mask in first look. Patient was wearing facemask when I entered the room and throughout our encounter. I wore full protective equipment throughout this patient encounter including a face mask, and gloves. Hand hygiene was performed before donning protective equipment and after removal when leaving the room.    Dictated utilizing Dragon dictation.      Note Disclaimer: At [facility], we believe that sharing information builds trust and better relationships. You are receiving this note because you recently visited [facility]. It is possible you will see health information before  a provider has talked with you about it. This kind of information can be easy to misunderstand. To help you fully understand what it means for your health, we urge you to discuss this note with your provider.           Monica Nash PA  12/07/22 1400

## 2022-12-07 NOTE — ED PROVIDER NOTES
MD ATTESTATION NOTE    The SHALINI and I have discussed this patient's history, physical exam, and treatment plan.    I provided a substantive portion of the care of this patient. I personally performed the physical exam, in its entirety. The attached note describes my personal findings.      Roxie Dickson is a 47 y.o. female who presents to the ED c/o right fifth toe pain after stubbing her toe on a cardboard box last night.      On exam:  GENERAL: not distressed  HENT: nares patent  EYES: no scleral icterus  CV: regular rhythm, regular rate, good capillary refill to the right fifth toe, 2+ right DP pulse  RESPIRATORY: normal effort  MUSCULOSKELETAL: Bleeding around the periphery of the nailbed to the right fifth toe.  There is bruising at the base of the right fifth toe.  NEURO: alert, moves all extremities, follows commands  SKIN: warm, dry    Labs  No results found for this or any previous visit (from the past 24 hour(s)).    Radiology  XR Toe 2+ View Right    Result Date: 12/7/2022  THREE-VIEW RIGHT FIFTH TOE  HISTORY: Trauma. Pain.  FINDINGS: There is a suspicion of a minimal nondisplaced fracture near the head of the fifth proximal phalanx.  This report was finalized on 12/7/2022 12:12 PM by Dr. Jeevan Pimentel M.D.        Medical Decision Making:           PPE: Both the patient and I wore a surgical mask throughout the entire patient encounter. I wore protective goggles.     Diagnosis  Final diagnoses:   Nondisplaced fracture of proximal phalanx of right lesser toe(s), initial encounter for closed fracture        Srinivasa Villatoro II, MD  12/08/22 2026

## (undated) DEVICE — TUBING, SUCTION, 1/4" X 10', STRAIGHT: Brand: MEDLINE

## (undated) DEVICE — THE SINGLE USE ETRAP – POLYP TRAP IS USED FOR SUCTION RETRIEVAL OF ENDOSCOPICALLY REMOVED POLYPS.: Brand: ETRAP

## (undated) DEVICE — SENSR O2 OXIMAX FNGR A/ 18IN NONSTR

## (undated) DEVICE — MSK ENDO PORT O2 POM ELITE CURAPLEX A/

## (undated) DEVICE — BITEBLOCK OMNI BLOC

## (undated) DEVICE — FRCP BX RADJAW4 NDL 2.8 240CM LG OG BX40

## (undated) DEVICE — KT ORCA ORCAPOD DISP STRL

## (undated) DEVICE — SNAR POLYP CAPTIVATOR RND STFF 2.4 240CM 10MM 1P/U

## (undated) DEVICE — CANNULA,ADULT,SOFT-TOUCH,7TUBE,SC: Brand: MEDLINE

## (undated) DEVICE — Device: Brand: DEFENDO AIR/WATER/SUCTION AND BIOPSY VALVE

## (undated) DEVICE — ADAPT CLN BIOGUARD AIR/H2O DISP

## (undated) DEVICE — LN SMPL CO2 SHTRM SD STREAM W/M LUER

## (undated) DEVICE — THE TORRENT IRRIGATION SCOPE CONNECTOR IS USED WITH THE TORRENT IRRIGATION TUBING TO PROVIDE IRRIGATION FLUIDS SUCH AS STERILE WATER DURING GASTROINTESTINAL ENDOSCOPIC PROCEDURES WHEN USED IN CONJUNCTION WITH AN IRRIGATION PUMP (OR ELECTROSURGICAL UNIT).: Brand: TORRENT

## (undated) DEVICE — CANN NASL CO2 TRULINK W/O2 A/